# Patient Record
Sex: MALE | Race: WHITE | Employment: OTHER | URBAN - METROPOLITAN AREA
[De-identification: names, ages, dates, MRNs, and addresses within clinical notes are randomized per-mention and may not be internally consistent; named-entity substitution may affect disease eponyms.]

---

## 2017-03-24 ENCOUNTER — HOSPITAL ENCOUNTER (OUTPATIENT)
Dept: CT IMAGING | Age: 72
Discharge: HOME | End: 2017-03-24
Attending: INTERNAL MEDICINE | Admitting: INTERNAL MEDICINE

## 2017-03-24 DIAGNOSIS — C81.18 NODULAR SCLEROSIS CLASSICAL HODGKIN LYMPHOMA OF LYMPH NODES OF MULTIPLE SITES (HCC): ICD-10-CM

## 2017-03-24 DIAGNOSIS — Z51.12 ENCOUNTER FOR ANTINEOPLASTIC IMMUNOTHERAPY: ICD-10-CM

## 2017-04-05 ENCOUNTER — OFFICE VISIT (OUTPATIENT)
Dept: INTERNAL MEDICINE CLINIC | Age: 72
End: 2017-04-05

## 2017-04-05 VITALS
DIASTOLIC BLOOD PRESSURE: 80 MMHG | BODY MASS INDEX: 29.74 KG/M2 | WEIGHT: 219.6 LBS | OXYGEN SATURATION: 97 % | HEIGHT: 72 IN | SYSTOLIC BLOOD PRESSURE: 130 MMHG | HEART RATE: 91 BPM

## 2017-04-05 DIAGNOSIS — T45.1X5A CYTOTOXIC DRUG-INDUCED LUNG INJURY (HCC): ICD-10-CM

## 2017-04-05 DIAGNOSIS — T38.0X5A STEROID-INDUCED DIABETES MELLITUS (HCC): Chronic | ICD-10-CM

## 2017-04-05 DIAGNOSIS — E09.9 STEROID-INDUCED DIABETES MELLITUS (HCC): Chronic | ICD-10-CM

## 2017-04-05 DIAGNOSIS — K59.00 COLONIC CONSTIPATION: ICD-10-CM

## 2017-04-05 DIAGNOSIS — J70.9 CYTOTOXIC DRUG-INDUCED LUNG INJURY (HCC): ICD-10-CM

## 2017-04-05 DIAGNOSIS — C81.90 HODGKIN'S DISEASE IN ADULT (HCC): ICD-10-CM

## 2017-04-05 DIAGNOSIS — E03.2 HYPOTHYROIDISM DUE TO MEDICATION: Primary | ICD-10-CM

## 2017-04-05 DIAGNOSIS — R74.8 ABNORMAL LIVER ENZYMES: ICD-10-CM

## 2017-04-05 PROCEDURE — 99214 OFFICE O/P EST MOD 30 MIN: CPT | Performed by: INTERNAL MEDICINE

## 2017-04-05 RX ORDER — LEVOTHYROXINE SODIUM 0.07 MG/1
75 TABLET ORAL DAILY
Qty: 30 TABLET | Refills: 3 | Status: SHIPPED | OUTPATIENT
Start: 2017-04-05 | End: 2017-05-05 | Stop reason: SDUPTHER

## 2017-04-05 ASSESSMENT — ENCOUNTER SYMPTOMS
BLOOD IN STOOL: 0
STRIDOR: 0
SORE THROAT: 0
VOMITING: 0
EYES NEGATIVE: 1
RESPIRATORY NEGATIVE: 1
SPUTUM PRODUCTION: 0
EYE PAIN: 0
DIARRHEA: 1
BACK PAIN: 0
EYE DISCHARGE: 0
SHORTNESS OF BREATH: 0
BLURRED VISION: 0
WHEEZING: 0
NAUSEA: 0
DOUBLE VISION: 0
CONSTIPATION: 1
PHOTOPHOBIA: 0
ABDOMINAL PAIN: 0
HEARTBURN: 0
ORTHOPNEA: 0
HEMOPTYSIS: 0
EYE REDNESS: 0
COUGH: 0

## 2017-05-05 ENCOUNTER — OFFICE VISIT (OUTPATIENT)
Dept: ENDOCRINOLOGY | Age: 72
End: 2017-05-05

## 2017-05-05 VITALS
HEIGHT: 69 IN | RESPIRATION RATE: 16 BRPM | WEIGHT: 216 LBS | OXYGEN SATURATION: 95 % | HEART RATE: 70 BPM | BODY MASS INDEX: 31.99 KG/M2 | SYSTOLIC BLOOD PRESSURE: 138 MMHG | DIASTOLIC BLOOD PRESSURE: 86 MMHG

## 2017-05-05 DIAGNOSIS — E03.2 HYPOTHYROIDISM DUE TO DRUGS: ICD-10-CM

## 2017-05-05 DIAGNOSIS — E03.2 HYPOTHYROIDISM DUE TO MEDICATION: ICD-10-CM

## 2017-05-05 PROCEDURE — 3017F COLORECTAL CA SCREEN DOC REV: CPT | Performed by: INTERNAL MEDICINE

## 2017-05-05 PROCEDURE — 1123F ACP DISCUSS/DSCN MKR DOCD: CPT | Performed by: INTERNAL MEDICINE

## 2017-05-05 PROCEDURE — G8427 DOCREV CUR MEDS BY ELIG CLIN: HCPCS | Performed by: INTERNAL MEDICINE

## 2017-05-05 PROCEDURE — 4040F PNEUMOC VAC/ADMIN/RCVD: CPT | Performed by: INTERNAL MEDICINE

## 2017-05-05 PROCEDURE — 99204 OFFICE O/P NEW MOD 45 MIN: CPT | Performed by: INTERNAL MEDICINE

## 2017-05-05 PROCEDURE — G8417 CALC BMI ABV UP PARAM F/U: HCPCS | Performed by: INTERNAL MEDICINE

## 2017-05-05 PROCEDURE — 1036F TOBACCO NON-USER: CPT | Performed by: INTERNAL MEDICINE

## 2017-05-05 RX ORDER — LEVOTHYROXINE SODIUM 0.07 MG/1
75 TABLET ORAL DAILY
Qty: 30 TABLET | Refills: 0 | Status: SHIPPED | OUTPATIENT
Start: 2017-05-05 | End: 2017-05-17

## 2017-05-17 ENCOUNTER — TELEPHONE (OUTPATIENT)
Dept: ENDOCRINOLOGY | Age: 72
End: 2017-05-17

## 2017-05-17 RX ORDER — LEVOTHYROXINE SODIUM 88 UG/1
88 TABLET ORAL DAILY
Qty: 30 TABLET | Refills: 3 | Status: SHIPPED | OUTPATIENT
Start: 2017-05-17 | End: 2017-08-24 | Stop reason: SDUPTHER

## 2017-06-01 ENCOUNTER — HOSPITAL ENCOUNTER (OUTPATIENT)
Dept: CT IMAGING | Age: 72
Discharge: HOME | End: 2017-06-01
Admitting: INTERNAL MEDICINE

## 2017-06-01 DIAGNOSIS — Z51.12 ENCOUNTER FOR ANTINEOPLASTIC IMMUNOTHERAPY: ICD-10-CM

## 2017-08-03 ENCOUNTER — OFFICE VISIT (OUTPATIENT)
Dept: INTERNAL MEDICINE CLINIC | Age: 72
End: 2017-08-03

## 2017-08-03 VITALS
WEIGHT: 209.6 LBS | OXYGEN SATURATION: 97 % | DIASTOLIC BLOOD PRESSURE: 70 MMHG | SYSTOLIC BLOOD PRESSURE: 130 MMHG | HEART RATE: 71 BPM | BODY MASS INDEX: 28.39 KG/M2 | HEIGHT: 72 IN

## 2017-08-03 DIAGNOSIS — M25.512 ACUTE PAIN OF LEFT SHOULDER: Primary | ICD-10-CM

## 2017-08-03 PROCEDURE — 99213 OFFICE O/P EST LOW 20 MIN: CPT | Performed by: INTERNAL MEDICINE

## 2017-08-03 RX ORDER — TRAMADOL HYDROCHLORIDE 50 MG/1
50 TABLET ORAL EVERY 6 HOURS PRN
Qty: 40 TABLET | Refills: 0 | Status: SHIPPED | OUTPATIENT
Start: 2017-08-03 | End: 2017-08-11

## 2017-08-03 RX ORDER — TIZANIDINE 2 MG/1
2 TABLET ORAL EVERY 8 HOURS PRN
Qty: 30 TABLET | Refills: 0 | Status: SHIPPED | OUTPATIENT
Start: 2017-08-03 | End: 2017-08-11

## 2017-08-08 PROBLEM — E03.2 DRUG-INDUCED HYPOTHYROIDISM: Status: ACTIVE | Noted: 2017-08-08

## 2017-08-09 ENCOUNTER — HOSPITAL ENCOUNTER (OUTPATIENT)
Dept: MRI IMAGING | Age: 72
Discharge: HOME | End: 2017-08-09
Attending: INTERNAL MEDICINE | Admitting: INTERNAL MEDICINE

## 2017-08-09 ENCOUNTER — TELEPHONE (OUTPATIENT)
Dept: INTERNAL MEDICINE CLINIC | Age: 72
End: 2017-08-09

## 2017-08-09 DIAGNOSIS — M25.512 PAIN IN LEFT SHOULDER: ICD-10-CM

## 2017-08-09 DIAGNOSIS — M25.512 ACUTE PAIN OF LEFT SHOULDER: ICD-10-CM

## 2017-08-11 ENCOUNTER — OFFICE VISIT (OUTPATIENT)
Dept: ORTHOPEDIC SURGERY | Age: 72
End: 2017-08-11

## 2017-08-11 VITALS
BODY MASS INDEX: 28.17 KG/M2 | DIASTOLIC BLOOD PRESSURE: 79 MMHG | WEIGHT: 208 LBS | HEART RATE: 69 BPM | SYSTOLIC BLOOD PRESSURE: 134 MMHG | HEIGHT: 72 IN

## 2017-08-11 DIAGNOSIS — M25.512 ACUTE PAIN OF LEFT SHOULDER: Primary | ICD-10-CM

## 2017-08-11 DIAGNOSIS — M75.112 INCOMPLETE TEAR OF LEFT ROTATOR CUFF: ICD-10-CM

## 2017-08-11 PROCEDURE — G8427 DOCREV CUR MEDS BY ELIG CLIN: HCPCS | Performed by: ORTHOPAEDIC SURGERY

## 2017-08-11 PROCEDURE — 99214 OFFICE O/P EST MOD 30 MIN: CPT | Performed by: ORTHOPAEDIC SURGERY

## 2017-08-11 PROCEDURE — 3017F COLORECTAL CA SCREEN DOC REV: CPT | Performed by: ORTHOPAEDIC SURGERY

## 2017-08-11 PROCEDURE — 4040F PNEUMOC VAC/ADMIN/RCVD: CPT | Performed by: ORTHOPAEDIC SURGERY

## 2017-08-11 PROCEDURE — G8417 CALC BMI ABV UP PARAM F/U: HCPCS | Performed by: ORTHOPAEDIC SURGERY

## 2017-08-11 PROCEDURE — 1036F TOBACCO NON-USER: CPT | Performed by: ORTHOPAEDIC SURGERY

## 2017-08-11 PROCEDURE — 1123F ACP DISCUSS/DSCN MKR DOCD: CPT | Performed by: ORTHOPAEDIC SURGERY

## 2017-08-11 RX ORDER — METHYLPREDNISOLONE 4 MG/1
TABLET ORAL
Qty: 1 KIT | Refills: 0 | Status: SHIPPED | OUTPATIENT
Start: 2017-08-11 | End: 2017-08-24 | Stop reason: ALTCHOICE

## 2017-08-11 ASSESSMENT — ENCOUNTER SYMPTOMS
EYES NEGATIVE: 1
RESPIRATORY NEGATIVE: 1
BACK PAIN: 0
GASTROINTESTINAL NEGATIVE: 1

## 2017-08-15 ENCOUNTER — HOSPITAL ENCOUNTER (OUTPATIENT)
Dept: PHYSICAL THERAPY | Age: 72
Discharge: HOME | End: 2017-08-31
Attending: ORTHOPAEDIC SURGERY | Admitting: ORTHOPAEDIC SURGERY

## 2017-08-24 ENCOUNTER — OFFICE VISIT (OUTPATIENT)
Dept: ENDOCRINOLOGY | Age: 72
End: 2017-08-24

## 2017-08-24 VITALS
HEIGHT: 72 IN | SYSTOLIC BLOOD PRESSURE: 138 MMHG | WEIGHT: 208.4 LBS | BODY MASS INDEX: 28.23 KG/M2 | DIASTOLIC BLOOD PRESSURE: 72 MMHG | RESPIRATION RATE: 16 BRPM | HEART RATE: 68 BPM

## 2017-08-24 DIAGNOSIS — E03.2 HYPOTHYROIDISM DUE TO DRUGS: Primary | ICD-10-CM

## 2017-08-24 PROCEDURE — 1123F ACP DISCUSS/DSCN MKR DOCD: CPT | Performed by: INTERNAL MEDICINE

## 2017-08-24 PROCEDURE — 99213 OFFICE O/P EST LOW 20 MIN: CPT | Performed by: INTERNAL MEDICINE

## 2017-08-24 PROCEDURE — G8427 DOCREV CUR MEDS BY ELIG CLIN: HCPCS | Performed by: INTERNAL MEDICINE

## 2017-08-24 PROCEDURE — G8417 CALC BMI ABV UP PARAM F/U: HCPCS | Performed by: INTERNAL MEDICINE

## 2017-08-24 PROCEDURE — 1036F TOBACCO NON-USER: CPT | Performed by: INTERNAL MEDICINE

## 2017-08-24 PROCEDURE — 3017F COLORECTAL CA SCREEN DOC REV: CPT | Performed by: INTERNAL MEDICINE

## 2017-08-24 PROCEDURE — 4040F PNEUMOC VAC/ADMIN/RCVD: CPT | Performed by: INTERNAL MEDICINE

## 2017-08-24 RX ORDER — LEVOTHYROXINE SODIUM 88 UG/1
88 TABLET ORAL DAILY
Qty: 90 TABLET | Refills: 1 | Status: SHIPPED | OUTPATIENT
Start: 2017-08-24 | End: 2017-12-28 | Stop reason: SDUPTHER

## 2017-09-11 ENCOUNTER — HOSPITAL ENCOUNTER (OUTPATIENT)
Dept: PHYSICAL THERAPY | Age: 72
Discharge: HOME | End: 2017-09-12
Admitting: ORTHOPAEDIC SURGERY

## 2017-09-15 ENCOUNTER — HOSPITAL ENCOUNTER (OUTPATIENT)
Dept: PHYSICAL THERAPY | Age: 72
Discharge: HOME | End: 2017-09-16
Admitting: ORTHOPAEDIC SURGERY

## 2017-09-18 ENCOUNTER — HOSPITAL ENCOUNTER (OUTPATIENT)
Dept: PHYSICAL THERAPY | Age: 72
Discharge: HOME | End: 2017-09-19
Admitting: ORTHOPAEDIC SURGERY

## 2017-09-22 ENCOUNTER — HOSPITAL ENCOUNTER (OUTPATIENT)
Dept: PHYSICAL THERAPY | Age: 72
Discharge: HOME | End: 2017-09-23
Admitting: ORTHOPAEDIC SURGERY

## 2017-09-22 ENCOUNTER — OFFICE VISIT (OUTPATIENT)
Dept: ORTHOPEDIC SURGERY | Age: 72
End: 2017-09-22

## 2017-09-22 VITALS
HEART RATE: 80 BPM | SYSTOLIC BLOOD PRESSURE: 120 MMHG | RESPIRATION RATE: 12 BRPM | WEIGHT: 208 LBS | HEIGHT: 72 IN | DIASTOLIC BLOOD PRESSURE: 80 MMHG | BODY MASS INDEX: 28.17 KG/M2

## 2017-09-22 DIAGNOSIS — M75.112 INCOMPLETE TEAR OF LEFT ROTATOR CUFF: ICD-10-CM

## 2017-09-22 DIAGNOSIS — M25.512 ACUTE PAIN OF LEFT SHOULDER: Primary | ICD-10-CM

## 2017-09-22 PROCEDURE — 99213 OFFICE O/P EST LOW 20 MIN: CPT | Performed by: ORTHOPAEDIC SURGERY

## 2017-09-22 PROCEDURE — 1036F TOBACCO NON-USER: CPT | Performed by: ORTHOPAEDIC SURGERY

## 2017-09-22 PROCEDURE — G8417 CALC BMI ABV UP PARAM F/U: HCPCS | Performed by: ORTHOPAEDIC SURGERY

## 2017-09-22 PROCEDURE — 1123F ACP DISCUSS/DSCN MKR DOCD: CPT | Performed by: ORTHOPAEDIC SURGERY

## 2017-09-22 PROCEDURE — 3017F COLORECTAL CA SCREEN DOC REV: CPT | Performed by: ORTHOPAEDIC SURGERY

## 2017-09-22 PROCEDURE — 4040F PNEUMOC VAC/ADMIN/RCVD: CPT | Performed by: ORTHOPAEDIC SURGERY

## 2017-09-22 PROCEDURE — G8427 DOCREV CUR MEDS BY ELIG CLIN: HCPCS | Performed by: ORTHOPAEDIC SURGERY

## 2017-09-25 ENCOUNTER — HOSPITAL ENCOUNTER (OUTPATIENT)
Dept: PHYSICAL THERAPY | Age: 72
Discharge: HOME | End: 2017-09-26
Admitting: ORTHOPAEDIC SURGERY

## 2017-10-02 ENCOUNTER — HOSPITAL ENCOUNTER (OUTPATIENT)
Dept: PHYSICAL THERAPY | Age: 72
Discharge: HOME | End: 2017-10-03
Admitting: ORTHOPAEDIC SURGERY

## 2017-10-25 ENCOUNTER — HOSPITAL ENCOUNTER (OUTPATIENT)
Dept: CT IMAGING | Age: 72
Discharge: HOME | End: 2017-10-25
Attending: INTERNAL MEDICINE | Admitting: INTERNAL MEDICINE

## 2017-10-25 DIAGNOSIS — C81.90 HODGKIN LYMPHOMA (HCC): ICD-10-CM

## 2017-10-25 DIAGNOSIS — C81.00 NODULAR LYMPHOCYTE PREDOMINANT HODGKIN LYMPHOMA, UNSPECIFIED BODY REGION (HCC): ICD-10-CM

## 2017-10-25 LAB
GFR AFRICAN AMERICAN: >60
GFR NON-AFRICAN AMERICAN: >60
PERFORMED ON: NORMAL
POC CREATININE: 0.9 MG/DL (ref 0.8–1.3)
POC SAMPLE TYPE: NORMAL

## 2017-11-01 ENCOUNTER — HOSPITAL ENCOUNTER (OUTPATIENT)
Dept: OTHER | Age: 72
Discharge: HOME | End: 2017-11-30
Attending: ORTHOPAEDIC SURGERY | Admitting: ORTHOPAEDIC SURGERY

## 2017-12-12 ENCOUNTER — TELEPHONE (OUTPATIENT)
Dept: FAMILY MEDICINE CLINIC | Age: 72
End: 2017-12-12

## 2017-12-14 NOTE — TELEPHONE ENCOUNTER
Called patient to advised that the lab down stairs did not have it either, patient is going to try to email results me he said if that did not work he will drop them off

## 2017-12-28 ENCOUNTER — OFFICE VISIT (OUTPATIENT)
Dept: ENDOCRINOLOGY | Age: 72
End: 2017-12-28

## 2017-12-28 VITALS
WEIGHT: 210.4 LBS | BODY MASS INDEX: 28.5 KG/M2 | SYSTOLIC BLOOD PRESSURE: 130 MMHG | DIASTOLIC BLOOD PRESSURE: 78 MMHG | HEART RATE: 82 BPM | RESPIRATION RATE: 16 BRPM | HEIGHT: 72 IN

## 2017-12-28 DIAGNOSIS — E03.2 HYPOTHYROIDISM DUE TO DRUGS: Primary | ICD-10-CM

## 2017-12-28 PROCEDURE — 1036F TOBACCO NON-USER: CPT | Performed by: INTERNAL MEDICINE

## 2017-12-28 PROCEDURE — 3017F COLORECTAL CA SCREEN DOC REV: CPT | Performed by: INTERNAL MEDICINE

## 2017-12-28 PROCEDURE — 99213 OFFICE O/P EST LOW 20 MIN: CPT | Performed by: INTERNAL MEDICINE

## 2017-12-28 PROCEDURE — G8417 CALC BMI ABV UP PARAM F/U: HCPCS | Performed by: INTERNAL MEDICINE

## 2017-12-28 PROCEDURE — 1123F ACP DISCUSS/DSCN MKR DOCD: CPT | Performed by: INTERNAL MEDICINE

## 2017-12-28 PROCEDURE — G8484 FLU IMMUNIZE NO ADMIN: HCPCS | Performed by: INTERNAL MEDICINE

## 2017-12-28 PROCEDURE — G8427 DOCREV CUR MEDS BY ELIG CLIN: HCPCS | Performed by: INTERNAL MEDICINE

## 2017-12-28 PROCEDURE — 4040F PNEUMOC VAC/ADMIN/RCVD: CPT | Performed by: INTERNAL MEDICINE

## 2017-12-28 RX ORDER — LEVOTHYROXINE SODIUM 88 UG/1
88 TABLET ORAL DAILY
Qty: 90 TABLET | Refills: 1 | Status: SHIPPED | OUTPATIENT
Start: 2017-12-28 | End: 2018-06-28 | Stop reason: SDUPTHER

## 2017-12-28 NOTE — PROGRESS NOTES
Subjective:      65 y/o WM who is here for thyroid evaluation. He was diagnosed with hodgkin's lymphoma. Is currently on Nivolumab. TSH had been gradually increasing, he was on levothyroxine 75mcg for 20 days. Labs:  12/16 TSH 2.3  1/17 TSH 3.1  2/17 TSH 4.47  3/17 TSH 46  4/17 TSH 74  4/17 TSH 25 on levothyroxine 75mcg    Initial complaints: Fatigue, Muscle pain  History of obstructive symptoms:  difficulty swallowing No, changes in voice/hoarseness  No.    5/17 TSH 19 FT4 1.0  Level have improved but needs adjustment, increased to 88mcg    8/17 TSH 2.7  12/17 TSH 1.5    Past Medical History:   Diagnosis Date    Bleomycin lung toxicity     Lymphoma ([facility] [address]     Hodgkins stage 3     Past Surgical History:   Procedure Laterality Date    BONE MARROW BIOPSY  8/8 and 8/18/2014    Positive for lymphoma with subsequent negative    CATARACT REMOVAL Bilateral 7/23/2015    COLONOSCOPY  3/15    Dr. Krista Meek - [place] [facility]  8/6/14    Dr. Klarissa Maldonado Left 06/16/2016    CERVICAL LYMPH NODE BIOPSY        TUNNELED VENOUS PORT PLACEMENT  08/14/2014    Dr. Adela Fernandes, power port    UPPER GASTROINTESTINAL ENDOSCOPY  3/15    Dr. Krsita Meek     Current Outpatient Prescriptions   Medication Sig Dispense Refill    levothyroxine (LEVOTHROID) 88 MCG tablet Take 1 tablet by mouth daily 90 tablet 1    aspirin 81 MG chewable tablet Take 81 mg by mouth daily      Multiple Vitamins-Minerals (MULTIVITAMIN PO) Take  by mouth. No current facility-administered medications for this visit.         Review of Systems  scanned       Objective:    /78 (Site: Right Arm, Position: Sitting, Cuff Size: Medium Adult)   Pulse 82   Resp 16   Ht 6' (1.829 m)   Wt 210 lb 6.4 oz (95.4 kg)   BMI 28.54 kg/m²   Wt Readings from Last 3 Encounters:   12/28/17 210 lb 6.4 oz (95.4 kg)   09/22/17 208 lb (94.3 kg)   08/24/17 208 lb 6.4 oz (94.5 kg)       Constitutional: Well-developed, appears stated age, cooperative, in no acute distress  H/E/N/M/T:atraumatic, normocephalic, external ears, nose, lips normal without lesions  Eyes: Lids, lashes, conjunctivae and sclerae normal, No proptosis  Neck: supple, symmetrical, trachea midline   Thyroid: gland size normal  Skin: No obvious rashes or lesions present. Skin and hair texture normal  Psychiatric: Judgement and Insight:  judgement and insight appear normal  Neuro: Normal without focal findings, speech is normal normal, speech is spontaneous    Lab Review  Lab Results   Component Value Date    TSH 2.780 08/22/2017     No results found for: FREET4      Assessment: 1. Hypothyroidism: Secondary to drug therapy, TSH gradually increasing, now on replacement with improvement in levels. Now euthyroid Discussed would need TSH monitoring since on Nivolumab  2. Hodgkin's Lymphoma     Plan: 1. Levothyroxine 88mcg  2. TFT in 6 months

## 2018-01-17 ENCOUNTER — HOSPITAL ENCOUNTER (OUTPATIENT)
Dept: CT IMAGING | Age: 73
Discharge: HOME | End: 2018-01-17
Attending: INTERNAL MEDICINE | Admitting: INTERNAL MEDICINE

## 2018-01-17 DIAGNOSIS — C81.90 HODGKIN LYMPHOMA, UNSPECIFIED HODGKIN LYMPHOMA TYPE, UNSPECIFIED BODY REGION (HCC): ICD-10-CM

## 2018-01-17 DIAGNOSIS — C81.90 HODGKIN LYMPHOMA (HCC): ICD-10-CM

## 2018-03-05 ENCOUNTER — OFFICE VISIT (OUTPATIENT)
Dept: INTERNAL MEDICINE CLINIC | Age: 73
End: 2018-03-05

## 2018-03-05 VITALS
DIASTOLIC BLOOD PRESSURE: 70 MMHG | SYSTOLIC BLOOD PRESSURE: 136 MMHG | OXYGEN SATURATION: 97 % | RESPIRATION RATE: 16 BRPM | WEIGHT: 212 LBS | HEART RATE: 76 BPM | BODY MASS INDEX: 28.71 KG/M2 | HEIGHT: 72 IN | TEMPERATURE: 97.6 F

## 2018-03-05 DIAGNOSIS — E09.9 STEROID-INDUCED DIABETES MELLITUS (HCC): ICD-10-CM

## 2018-03-05 DIAGNOSIS — C81.90 HODGKIN'S DISEASE IN ADULT (HCC): ICD-10-CM

## 2018-03-05 DIAGNOSIS — B35.1 DERMATOPHYTIC ONYCHIA: ICD-10-CM

## 2018-03-05 DIAGNOSIS — H91.93 BILATERAL HEARING LOSS, UNSPECIFIED HEARING LOSS TYPE: ICD-10-CM

## 2018-03-05 DIAGNOSIS — N48.6 PEYRONIE DISEASE: Primary | ICD-10-CM

## 2018-03-05 DIAGNOSIS — K59.00 COLONIC CONSTIPATION: ICD-10-CM

## 2018-03-05 DIAGNOSIS — J84.10 PULMONARY FIBROSIS (HCC): ICD-10-CM

## 2018-03-05 DIAGNOSIS — T45.1X5A CYTOTOXIC DRUG-INDUCED LUNG INJURY (HCC): ICD-10-CM

## 2018-03-05 DIAGNOSIS — E03.2 HYPOTHYROIDISM DUE TO DRUGS: ICD-10-CM

## 2018-03-05 DIAGNOSIS — T38.0X5A STEROID-INDUCED DIABETES MELLITUS (HCC): ICD-10-CM

## 2018-03-05 DIAGNOSIS — J70.9 CYTOTOXIC DRUG-INDUCED LUNG INJURY (HCC): ICD-10-CM

## 2018-03-05 PROCEDURE — 3288F FALL RISK ASSESSMENT DOCD: CPT | Performed by: INTERNAL MEDICINE

## 2018-03-05 PROCEDURE — 99214 OFFICE O/P EST MOD 30 MIN: CPT | Performed by: INTERNAL MEDICINE

## 2018-03-05 ASSESSMENT — ENCOUNTER SYMPTOMS
EYE DISCHARGE: 0
ORTHOPNEA: 0
WHEEZING: 0
CONSTIPATION: 1
NAUSEA: 0
BLOOD IN STOOL: 0
HEARTBURN: 0
BLURRED VISION: 0
SORE THROAT: 0
EYES NEGATIVE: 1
RESPIRATORY NEGATIVE: 1
STRIDOR: 0
HEMOPTYSIS: 0
SPUTUM PRODUCTION: 0
PHOTOPHOBIA: 0
EYE PAIN: 0
BACK PAIN: 0
COUGH: 0
DIARRHEA: 1
EYE REDNESS: 0
DOUBLE VISION: 0
VOMITING: 0
ABDOMINAL PAIN: 0
SHORTNESS OF BREATH: 0

## 2018-03-05 NOTE — PROGRESS NOTES
fingernails and toenails have been thicker.     Has been having bowel problems recently as well with some diarrhea, has been taking immodium. Has been having rectal pain and bleeding recently. Prior to that he was experiencing some constipation, especially in the hospital.  His constipation has improved since starting levothyroxine with Dr. Antonette Peter. He is very concerned about bent penis during his erections, feels this is due to opdivo. He also notes bilateral hearing loss. He has been having right great toe pain, feels it is fractured. Review of Systems   Constitutional: Positive for malaise/fatigue. Negative for chills, diaphoresis, fever and weight loss. HENT: Negative for congestion, ear discharge, ear pain, hearing loss, nosebleeds, sore throat and tinnitus. Eyes: Negative. Negative for blurred vision, double vision, photophobia, pain, discharge and redness. Respiratory: Negative. Negative for cough, hemoptysis, sputum production, shortness of breath, wheezing and stridor. Cardiovascular: Negative. Negative for chest pain, palpitations, orthopnea, claudication, leg swelling and PND. Gastrointestinal: Positive for constipation and diarrhea. Negative for abdominal pain, blood in stool, heartburn, melena, nausea and vomiting. Genitourinary: Negative. Negative for dysuria, flank pain, frequency, hematuria and urgency. Musculoskeletal: Negative. Negative for back pain, falls, joint pain, myalgias and neck pain. Skin: Positive for rash. Negative for itching. Nail brittleness   Neurological: Positive for dizziness and weakness. Negative for tingling, tremors, sensory change, speech change, focal weakness, seizures, loss of consciousness and headaches. Endo/Heme/Allergies: Negative. Psychiatric/Behavioral: Negative. Negative for depression, hallucinations, memory loss, substance abuse and suicidal ideas. The patient is not nervous/anxious and does not have insomnia.

## 2018-03-12 ENCOUNTER — TELEPHONE (OUTPATIENT)
Dept: INTERNAL MEDICINE CLINIC | Age: 73
End: 2018-03-12

## 2018-03-12 NOTE — TELEPHONE ENCOUNTER
Needs clarification on which toes are being treated and how many drops on each toe.     Jublia 10% solution     please advise pharmacy

## 2018-03-16 ENCOUNTER — OFFICE VISIT (OUTPATIENT)
Dept: ENT CLINIC | Age: 73
End: 2018-03-16

## 2018-03-16 VITALS — HEART RATE: 76 BPM | DIASTOLIC BLOOD PRESSURE: 78 MMHG | SYSTOLIC BLOOD PRESSURE: 126 MMHG | OXYGEN SATURATION: 96 %

## 2018-03-16 DIAGNOSIS — H61.21 CERUMEN DEBRIS ON TYMPANIC MEMBRANE OF RIGHT EAR: ICD-10-CM

## 2018-03-16 DIAGNOSIS — H90.3 SENSORINEURAL HEARING LOSS (SNHL) OF BOTH EARS: Primary | ICD-10-CM

## 2018-03-16 DIAGNOSIS — H90.3 SENSORINEURAL HEARING LOSS OF BOTH EARS: Primary | ICD-10-CM

## 2018-03-16 PROCEDURE — 1123F ACP DISCUSS/DSCN MKR DOCD: CPT | Performed by: OTOLARYNGOLOGY

## 2018-03-16 PROCEDURE — 69210 REMOVE IMPACTED EAR WAX UNI: CPT | Performed by: OTOLARYNGOLOGY

## 2018-03-16 PROCEDURE — 99202 OFFICE O/P NEW SF 15 MIN: CPT | Performed by: OTOLARYNGOLOGY

## 2018-03-16 PROCEDURE — 1036F TOBACCO NON-USER: CPT | Performed by: OTOLARYNGOLOGY

## 2018-03-16 PROCEDURE — 4040F PNEUMOC VAC/ADMIN/RCVD: CPT | Performed by: OTOLARYNGOLOGY

## 2018-03-16 PROCEDURE — G8417 CALC BMI ABV UP PARAM F/U: HCPCS | Performed by: OTOLARYNGOLOGY

## 2018-03-16 PROCEDURE — 3017F COLORECTAL CA SCREEN DOC REV: CPT | Performed by: OTOLARYNGOLOGY

## 2018-03-16 PROCEDURE — G8484 FLU IMMUNIZE NO ADMIN: HCPCS | Performed by: OTOLARYNGOLOGY

## 2018-03-16 PROCEDURE — G8427 DOCREV CUR MEDS BY ELIG CLIN: HCPCS | Performed by: OTOLARYNGOLOGY

## 2018-03-16 PROCEDURE — 92553 AUDIOMETRY AIR & BONE: CPT | Performed by: AUDIOLOGIST

## 2018-03-16 NOTE — PROGRESS NOTES
likely above 1500 cps. It is associated with an increased speech reception threshold and decreased word recognition score. The sensation level is moderately elevated  Tympanometry reveals normal middle ear pressure, compliance    Impression:  Bilateral degenerative presbycusis  Right cerumen, resolved    Plan:  We discussed avoiding cotton applicators to clean the ears  He may try rubbing alcohol monthly as maintenance  He is a good candidate for amplification by means of hearing aid.   He will consider this option  Repeat the audiogram in 1 year

## 2018-04-02 ENCOUNTER — TELEPHONE (OUTPATIENT)
Dept: INTERNAL MEDICINE CLINIC | Age: 73
End: 2018-04-02

## 2018-04-25 ENCOUNTER — OFFICE VISIT (OUTPATIENT)
Dept: INTERNAL MEDICINE CLINIC | Age: 73
End: 2018-04-25

## 2018-04-25 VITALS
HEIGHT: 72 IN | OXYGEN SATURATION: 97 % | BODY MASS INDEX: 28.44 KG/M2 | RESPIRATION RATE: 16 BRPM | DIASTOLIC BLOOD PRESSURE: 78 MMHG | TEMPERATURE: 97.4 F | WEIGHT: 210 LBS | SYSTOLIC BLOOD PRESSURE: 132 MMHG | HEART RATE: 100 BPM

## 2018-04-25 DIAGNOSIS — T45.1X5A CYTOTOXIC DRUG-INDUCED LUNG INJURY (HCC): ICD-10-CM

## 2018-04-25 DIAGNOSIS — C81.90 HODGKIN'S DISEASE IN ADULT (HCC): ICD-10-CM

## 2018-04-25 DIAGNOSIS — H90.3 SENSORINEURAL HEARING LOSS (SNHL) OF BOTH EARS: ICD-10-CM

## 2018-04-25 DIAGNOSIS — N48.6 PEYRONIE DISEASE: Primary | ICD-10-CM

## 2018-04-25 DIAGNOSIS — G62.0 CHEMOTHERAPY-INDUCED NEUROPATHY (HCC): ICD-10-CM

## 2018-04-25 DIAGNOSIS — J70.9 CYTOTOXIC DRUG-INDUCED LUNG INJURY (HCC): ICD-10-CM

## 2018-04-25 DIAGNOSIS — T45.1X5A CHEMOTHERAPY-INDUCED NEUROPATHY (HCC): ICD-10-CM

## 2018-04-25 PROCEDURE — 99214 OFFICE O/P EST MOD 30 MIN: CPT | Performed by: INTERNAL MEDICINE

## 2018-04-25 ASSESSMENT — ENCOUNTER SYMPTOMS
HEMOPTYSIS: 0
HEARTBURN: 0
SORE THROAT: 0
EYES NEGATIVE: 1
STRIDOR: 0
VOMITING: 0
WHEEZING: 0
SPUTUM PRODUCTION: 0
EYE PAIN: 0
BLURRED VISION: 0
EYE DISCHARGE: 0
BACK PAIN: 0
ABDOMINAL PAIN: 0
DOUBLE VISION: 0
SHORTNESS OF BREATH: 0
COUGH: 0
DIARRHEA: 1
RESPIRATORY NEGATIVE: 1
NAUSEA: 0
ORTHOPNEA: 0
PHOTOPHOBIA: 0
BLOOD IN STOOL: 0
CONSTIPATION: 1
EYE REDNESS: 0

## 2018-05-10 ENCOUNTER — HOSPITAL ENCOUNTER (OUTPATIENT)
Dept: CT IMAGING | Age: 73
Discharge: HOME | End: 2018-05-10
Attending: INTERNAL MEDICINE | Admitting: INTERNAL MEDICINE

## 2018-05-10 DIAGNOSIS — C81.90 HODGKIN LYMPHOMA (HCC): ICD-10-CM

## 2018-05-10 DIAGNOSIS — C81.90 HODGKIN LYMPHOMA, UNSPECIFIED HODGKIN LYMPHOMA TYPE, UNSPECIFIED BODY REGION (HCC): ICD-10-CM

## 2018-05-10 LAB
GFR AFRICAN AMERICAN: >60
GFR NON-AFRICAN AMERICAN: >60
PERFORMED ON: ABNORMAL
POC CREATININE: <0.3 MG/DL (ref 0.8–1.3)
POC SAMPLE TYPE: ABNORMAL

## 2018-06-28 ENCOUNTER — OFFICE VISIT (OUTPATIENT)
Dept: ENDOCRINOLOGY | Age: 73
End: 2018-06-28

## 2018-06-28 VITALS
OXYGEN SATURATION: 94 % | HEART RATE: 77 BPM | DIASTOLIC BLOOD PRESSURE: 78 MMHG | RESPIRATION RATE: 16 BRPM | WEIGHT: 204.4 LBS | HEIGHT: 72 IN | SYSTOLIC BLOOD PRESSURE: 120 MMHG | BODY MASS INDEX: 27.68 KG/M2

## 2018-06-28 DIAGNOSIS — E03.2 HYPOTHYROIDISM DUE TO DRUGS: Primary | ICD-10-CM

## 2018-06-28 PROCEDURE — 99213 OFFICE O/P EST LOW 20 MIN: CPT | Performed by: INTERNAL MEDICINE

## 2018-06-28 PROCEDURE — 1036F TOBACCO NON-USER: CPT | Performed by: INTERNAL MEDICINE

## 2018-06-28 PROCEDURE — G8427 DOCREV CUR MEDS BY ELIG CLIN: HCPCS | Performed by: INTERNAL MEDICINE

## 2018-06-28 PROCEDURE — 3017F COLORECTAL CA SCREEN DOC REV: CPT | Performed by: INTERNAL MEDICINE

## 2018-06-28 PROCEDURE — G8417 CALC BMI ABV UP PARAM F/U: HCPCS | Performed by: INTERNAL MEDICINE

## 2018-06-28 PROCEDURE — 4040F PNEUMOC VAC/ADMIN/RCVD: CPT | Performed by: INTERNAL MEDICINE

## 2018-06-28 PROCEDURE — 1123F ACP DISCUSS/DSCN MKR DOCD: CPT | Performed by: INTERNAL MEDICINE

## 2018-06-28 RX ORDER — LEVOTHYROXINE SODIUM 88 UG/1
88 TABLET ORAL DAILY
Qty: 90 TABLET | Refills: 3 | Status: SHIPPED | OUTPATIENT
Start: 2018-06-28 | End: 2019-09-16 | Stop reason: SDUPTHER

## 2018-08-17 ENCOUNTER — HOSPITAL ENCOUNTER (OUTPATIENT)
Dept: CT IMAGING | Age: 73
Discharge: HOME | End: 2018-08-17
Attending: INTERNAL MEDICINE | Admitting: INTERNAL MEDICINE

## 2018-08-17 DIAGNOSIS — C81.90 HODGKIN LYMPHOMA, UNSPECIFIED HODGKIN LYMPHOMA TYPE, UNSPECIFIED BODY REGION (HCC): ICD-10-CM

## 2018-08-17 DIAGNOSIS — C81.90 HODGKIN LYMPHOMA (HCC): ICD-10-CM

## 2019-01-09 ENCOUNTER — HOSPITAL ENCOUNTER (OUTPATIENT)
Dept: CT IMAGING | Age: 74
Discharge: HOME | End: 2019-01-09
Payer: MEDICARE

## 2019-01-09 DIAGNOSIS — C81.00 NODULAR LYMPHOCYTE PREDOMINANT HODGKIN LYMPHOMA, UNSPECIFIED BODY REGION (HCC): ICD-10-CM

## 2019-01-09 PROCEDURE — 82565 ASSAY OF CREATININE: CPT

## 2019-01-09 PROCEDURE — 74177 CT ABD & PELVIS W/CONTRAST: CPT

## 2019-01-09 PROCEDURE — 6360000004 HC RX CONTRAST MEDICATION: Performed by: INTERNAL MEDICINE

## 2019-01-09 RX ADMIN — IOHEXOL 50 ML: 240 INJECTION, SOLUTION INTRATHECAL; INTRAVASCULAR; INTRAVENOUS; ORAL at 09:15

## 2019-01-09 RX ADMIN — IOPAMIDOL 75 ML: 755 INJECTION, SOLUTION INTRAVENOUS at 09:16

## 2019-01-11 ENCOUNTER — OFFICE VISIT (OUTPATIENT)
Dept: PULMONOLOGY | Age: 74
End: 2019-01-11
Payer: MEDICARE

## 2019-01-11 VITALS
HEIGHT: 72 IN | SYSTOLIC BLOOD PRESSURE: 120 MMHG | RESPIRATION RATE: 20 BRPM | DIASTOLIC BLOOD PRESSURE: 87 MMHG | TEMPERATURE: 97.7 F | HEART RATE: 96 BPM | BODY MASS INDEX: 27.09 KG/M2 | WEIGHT: 200 LBS | OXYGEN SATURATION: 91 %

## 2019-01-11 DIAGNOSIS — J96.11 CHRONIC RESPIRATORY FAILURE WITH HYPOXIA (HCC): ICD-10-CM

## 2019-01-11 DIAGNOSIS — J84.10 PULMONARY FIBROSIS (HCC): Primary | ICD-10-CM

## 2019-01-11 PROCEDURE — G8427 DOCREV CUR MEDS BY ELIG CLIN: HCPCS | Performed by: INTERNAL MEDICINE

## 2019-01-11 PROCEDURE — G8484 FLU IMMUNIZE NO ADMIN: HCPCS | Performed by: INTERNAL MEDICINE

## 2019-01-11 PROCEDURE — 1123F ACP DISCUSS/DSCN MKR DOCD: CPT | Performed by: INTERNAL MEDICINE

## 2019-01-11 PROCEDURE — 99214 OFFICE O/P EST MOD 30 MIN: CPT | Performed by: INTERNAL MEDICINE

## 2019-01-11 PROCEDURE — 1036F TOBACCO NON-USER: CPT | Performed by: INTERNAL MEDICINE

## 2019-01-11 PROCEDURE — 3017F COLORECTAL CA SCREEN DOC REV: CPT | Performed by: INTERNAL MEDICINE

## 2019-01-11 PROCEDURE — 1101F PT FALLS ASSESS-DOCD LE1/YR: CPT | Performed by: INTERNAL MEDICINE

## 2019-01-11 PROCEDURE — 4040F PNEUMOC VAC/ADMIN/RCVD: CPT | Performed by: INTERNAL MEDICINE

## 2019-01-11 PROCEDURE — G8417 CALC BMI ABV UP PARAM F/U: HCPCS | Performed by: INTERNAL MEDICINE

## 2019-01-11 RX ORDER — PREDNISONE 20 MG/1
40 TABLET ORAL DAILY
Qty: 60 TABLET | Refills: 0 | Status: SHIPPED | OUTPATIENT
Start: 2019-01-11 | End: 2019-02-05 | Stop reason: SDUPTHER

## 2019-01-18 ENCOUNTER — TELEPHONE (OUTPATIENT)
Dept: PULMONOLOGY | Age: 74
End: 2019-01-18

## 2019-01-18 ENCOUNTER — HOSPITAL ENCOUNTER (OUTPATIENT)
Dept: CARDIAC REHAB | Age: 74
Setting detail: THERAPIES SERIES
Discharge: HOME | End: 2019-01-18
Payer: MEDICARE

## 2019-01-18 DIAGNOSIS — J96.11 CHRONIC RESPIRATORY FAILURE WITH HYPOXIA (HCC): ICD-10-CM

## 2019-01-18 DIAGNOSIS — J84.10 PULMONARY FIBROSIS (HCC): ICD-10-CM

## 2019-01-18 PROCEDURE — 94618 PULMONARY STRESS TESTING: CPT | Performed by: INTERNAL MEDICINE

## 2019-01-18 PROCEDURE — 94618 PULMONARY STRESS TESTING: CPT

## 2019-01-21 RX ORDER — SULFAMETHOXAZOLE AND TRIMETHOPRIM 800; 160 MG/1; MG/1
TABLET ORAL
Qty: 30 TABLET | Refills: 0 | OUTPATIENT
Start: 2019-01-21 | End: 2019-02-05 | Stop reason: SDUPTHER

## 2019-01-23 ENCOUNTER — HOSPITAL ENCOUNTER (OUTPATIENT)
Dept: PULMONOLOGY | Age: 74
Discharge: HOME | End: 2019-01-23
Payer: MEDICARE

## 2019-01-23 DIAGNOSIS — J84.10 PULMONARY FIBROSIS (HCC): ICD-10-CM

## 2019-01-23 DIAGNOSIS — J96.11 CHRONIC RESPIRATORY FAILURE WITH HYPOXIA (HCC): ICD-10-CM

## 2019-01-23 PROCEDURE — 94664 DEMO&/EVAL PT USE INHALER: CPT

## 2019-01-23 PROCEDURE — 6370000000 HC RX 637 (ALT 250 FOR IP): Performed by: INTERNAL MEDICINE

## 2019-01-23 PROCEDURE — 94726 PLETHYSMOGRAPHY LUNG VOLUMES: CPT | Performed by: INTERNAL MEDICINE

## 2019-01-23 PROCEDURE — 94060 EVALUATION OF WHEEZING: CPT | Performed by: INTERNAL MEDICINE

## 2019-01-23 PROCEDURE — 94640 AIRWAY INHALATION TREATMENT: CPT

## 2019-01-23 PROCEDURE — 94729 DIFFUSING CAPACITY: CPT | Performed by: INTERNAL MEDICINE

## 2019-01-23 PROCEDURE — 94729 DIFFUSING CAPACITY: CPT

## 2019-01-23 PROCEDURE — 94060 EVALUATION OF WHEEZING: CPT

## 2019-01-23 PROCEDURE — 94726 PLETHYSMOGRAPHY LUNG VOLUMES: CPT

## 2019-01-23 RX ORDER — ALBUTEROL SULFATE 90 UG/1
4 AEROSOL, METERED RESPIRATORY (INHALATION) ONCE
Status: COMPLETED | OUTPATIENT
Start: 2019-01-23 | End: 2019-01-23

## 2019-01-23 RX ADMIN — ALBUTEROL SULFATE 4 PUFF: 90 AEROSOL, METERED RESPIRATORY (INHALATION) at 15:00

## 2019-02-05 ENCOUNTER — OFFICE VISIT (OUTPATIENT)
Dept: PULMONOLOGY | Age: 74
End: 2019-02-05
Payer: MEDICARE

## 2019-02-05 VITALS
TEMPERATURE: 98.1 F | HEIGHT: 72 IN | RESPIRATION RATE: 24 BRPM | OXYGEN SATURATION: 95 % | HEART RATE: 97 BPM | SYSTOLIC BLOOD PRESSURE: 138 MMHG | BODY MASS INDEX: 27.09 KG/M2 | DIASTOLIC BLOOD PRESSURE: 82 MMHG | WEIGHT: 200 LBS

## 2019-02-05 DIAGNOSIS — J70.9 CYTOTOXIC DRUG-INDUCED LUNG INJURY (HCC): Primary | ICD-10-CM

## 2019-02-05 DIAGNOSIS — J96.11 CHRONIC RESPIRATORY FAILURE WITH HYPOXIA (HCC): ICD-10-CM

## 2019-02-05 DIAGNOSIS — T45.1X5A CYTOTOXIC DRUG-INDUCED LUNG INJURY (HCC): ICD-10-CM

## 2019-02-05 DIAGNOSIS — R13.10 ODYNOPHAGIA: Primary | ICD-10-CM

## 2019-02-05 DIAGNOSIS — J70.9 CYTOTOXIC DRUG-INDUCED LUNG INJURY (HCC): ICD-10-CM

## 2019-02-05 DIAGNOSIS — T45.1X5A CYTOTOXIC DRUG-INDUCED LUNG INJURY (HCC): Primary | ICD-10-CM

## 2019-02-05 LAB
BASOPHILS ABSOLUTE: 0 K/UL (ref 0–0.2)
BASOPHILS RELATIVE PERCENT: 0.2 %
EOSINOPHILS ABSOLUTE: 0.1 K/UL (ref 0–0.6)
EOSINOPHILS RELATIVE PERCENT: 0.9 %
HCT VFR BLD CALC: 46.5 % (ref 40.5–52.5)
HEMOGLOBIN: 15.8 G/DL (ref 13.5–17.5)
LYMPHOCYTES ABSOLUTE: 1.6 K/UL (ref 1–5.1)
LYMPHOCYTES RELATIVE PERCENT: 16.3 %
MCH RBC QN AUTO: 30.5 PG (ref 26–34)
MCHC RBC AUTO-ENTMCNC: 34.1 G/DL (ref 31–36)
MCV RBC AUTO: 89.6 FL (ref 80–100)
MONOCYTES ABSOLUTE: 0.6 K/UL (ref 0–1.3)
MONOCYTES RELATIVE PERCENT: 5.6 %
NEUTROPHILS ABSOLUTE: 7.7 K/UL (ref 1.7–7.7)
NEUTROPHILS RELATIVE PERCENT: 77 %
PDW BLD-RTO: 14.1 % (ref 12.4–15.4)
PLATELET # BLD: 119 K/UL (ref 135–450)
PMV BLD AUTO: 8.5 FL (ref 5–10.5)
RBC # BLD: 5.18 M/UL (ref 4.2–5.9)
WBC # BLD: 10.1 K/UL (ref 4–11)

## 2019-02-05 PROCEDURE — 1036F TOBACCO NON-USER: CPT | Performed by: INTERNAL MEDICINE

## 2019-02-05 PROCEDURE — 3017F COLORECTAL CA SCREEN DOC REV: CPT | Performed by: INTERNAL MEDICINE

## 2019-02-05 PROCEDURE — 1101F PT FALLS ASSESS-DOCD LE1/YR: CPT | Performed by: INTERNAL MEDICINE

## 2019-02-05 PROCEDURE — 4040F PNEUMOC VAC/ADMIN/RCVD: CPT | Performed by: INTERNAL MEDICINE

## 2019-02-05 PROCEDURE — G8427 DOCREV CUR MEDS BY ELIG CLIN: HCPCS | Performed by: INTERNAL MEDICINE

## 2019-02-05 PROCEDURE — G8417 CALC BMI ABV UP PARAM F/U: HCPCS | Performed by: INTERNAL MEDICINE

## 2019-02-05 PROCEDURE — G8484 FLU IMMUNIZE NO ADMIN: HCPCS | Performed by: INTERNAL MEDICINE

## 2019-02-05 PROCEDURE — 1123F ACP DISCUSS/DSCN MKR DOCD: CPT | Performed by: INTERNAL MEDICINE

## 2019-02-05 PROCEDURE — 99214 OFFICE O/P EST MOD 30 MIN: CPT | Performed by: INTERNAL MEDICINE

## 2019-02-05 RX ORDER — PREDNISONE 20 MG/1
40 TABLET ORAL DAILY
Qty: 60 TABLET | Refills: 0 | Status: SHIPPED | OUTPATIENT
Start: 2019-02-05 | End: 2019-03-06

## 2019-02-05 RX ORDER — SULFAMETHOXAZOLE AND TRIMETHOPRIM 800; 160 MG/1; MG/1
TABLET ORAL
Qty: 30 TABLET | Refills: 0 | Status: SHIPPED | OUTPATIENT
Start: 2019-02-05 | End: 2019-05-03 | Stop reason: ALTCHOICE

## 2019-02-06 LAB — ANTI-NUCLEAR ANTIBODY (ANA): NEGATIVE

## 2019-02-08 LAB — ANCA IFA: NORMAL

## 2019-02-11 LAB
ALLERGEN BEEF: <0.1 KU/L
ALLERGEN PHOMA BETAE: <0.1 KU/L
ALLERGEN PORK: <0.1 KU/L
ALLERGEN SEE NOTE: NORMAL
ALLERGEN, FEATHER MIX: NEGATIVE KU/L
ASPERGILLUS FLAVUS ANTIBODIES: NORMAL
ASPERGILLUS FUMIGATUS #1: NORMAL
ASPERGILLUS FUMIGATUS #2: NORMAL
ASPERGILLUS FUMIGATUS #3: NORMAL
ASPERGILLUS FUMIGATUS #6: NORMAL
AUREOBASIDIUM PULLULANS: NORMAL
MICROPOLYSPORA FAENI: NORMAL
PIGEON SERUM ABS: NORMAL
SACCHAROMONOSPORA VIRIDIS: NORMAL
THERMOACTINOMYCES CANDIDUS AB: NORMAL
THERMOACTINOMYCES SACCHARI: NORMAL
THERMOACTINOMYCES VULGARIS #1: NORMAL

## 2019-02-13 ENCOUNTER — TELEPHONE (OUTPATIENT)
Dept: PULMONOLOGY | Age: 74
End: 2019-02-13

## 2019-02-13 ENCOUNTER — HOSPITAL ENCOUNTER (OUTPATIENT)
Dept: CT IMAGING | Age: 74
Discharge: HOME | End: 2019-02-13
Payer: MEDICARE

## 2019-02-13 DIAGNOSIS — T45.1X5A CYTOTOXIC DRUG-INDUCED LUNG INJURY (HCC): ICD-10-CM

## 2019-02-13 DIAGNOSIS — J70.9 CYTOTOXIC DRUG-INDUCED LUNG INJURY (HCC): ICD-10-CM

## 2019-02-13 PROCEDURE — 71250 CT THORAX DX C-: CPT

## 2019-02-27 ENCOUNTER — TELEPHONE (OUTPATIENT)
Dept: PULMONOLOGY | Age: 74
End: 2019-02-27

## 2019-03-06 ENCOUNTER — OFFICE VISIT (OUTPATIENT)
Dept: PULMONOLOGY | Age: 74
End: 2019-03-06
Payer: MEDICARE

## 2019-03-06 ENCOUNTER — TELEPHONE (OUTPATIENT)
Dept: PULMONOLOGY | Age: 74
End: 2019-03-06

## 2019-03-06 VITALS
HEART RATE: 106 BPM | DIASTOLIC BLOOD PRESSURE: 82 MMHG | OXYGEN SATURATION: 96 % | SYSTOLIC BLOOD PRESSURE: 132 MMHG | HEIGHT: 72 IN | RESPIRATION RATE: 20 BRPM | BODY MASS INDEX: 26.98 KG/M2 | TEMPERATURE: 97.8 F | WEIGHT: 199.2 LBS

## 2019-03-06 DIAGNOSIS — J84.89: Primary | ICD-10-CM

## 2019-03-06 DIAGNOSIS — T50.905A: Primary | ICD-10-CM

## 2019-03-06 DIAGNOSIS — J96.11 CHRONIC RESPIRATORY FAILURE WITH HYPOXIA (HCC): ICD-10-CM

## 2019-03-06 PROCEDURE — G8484 FLU IMMUNIZE NO ADMIN: HCPCS | Performed by: INTERNAL MEDICINE

## 2019-03-06 PROCEDURE — G8427 DOCREV CUR MEDS BY ELIG CLIN: HCPCS | Performed by: INTERNAL MEDICINE

## 2019-03-06 PROCEDURE — 99213 OFFICE O/P EST LOW 20 MIN: CPT | Performed by: INTERNAL MEDICINE

## 2019-03-06 PROCEDURE — 1123F ACP DISCUSS/DSCN MKR DOCD: CPT | Performed by: INTERNAL MEDICINE

## 2019-03-06 PROCEDURE — G8417 CALC BMI ABV UP PARAM F/U: HCPCS | Performed by: INTERNAL MEDICINE

## 2019-03-06 PROCEDURE — 3017F COLORECTAL CA SCREEN DOC REV: CPT | Performed by: INTERNAL MEDICINE

## 2019-03-06 PROCEDURE — 4040F PNEUMOC VAC/ADMIN/RCVD: CPT | Performed by: INTERNAL MEDICINE

## 2019-03-06 PROCEDURE — 1101F PT FALLS ASSESS-DOCD LE1/YR: CPT | Performed by: INTERNAL MEDICINE

## 2019-03-06 PROCEDURE — 1036F TOBACCO NON-USER: CPT | Performed by: INTERNAL MEDICINE

## 2019-03-06 RX ORDER — PREDNISONE 20 MG/1
TABLET ORAL
Qty: 60 TABLET | Refills: 0 | Status: SHIPPED | OUTPATIENT
Start: 2019-03-06 | End: 2019-04-13

## 2019-03-08 ENCOUNTER — TELEPHONE (OUTPATIENT)
Dept: PULMONOLOGY | Age: 74
End: 2019-03-08

## 2019-03-11 ENCOUNTER — TELEPHONE (OUTPATIENT)
Dept: PULMONOLOGY | Age: 74
End: 2019-03-11

## 2019-04-08 ENCOUNTER — HOSPITAL ENCOUNTER (OUTPATIENT)
Dept: PULMONOLOGY | Age: 74
Discharge: HOME | End: 2019-04-08
Payer: MEDICARE

## 2019-04-08 DIAGNOSIS — T50.905A: ICD-10-CM

## 2019-04-08 DIAGNOSIS — J84.89: ICD-10-CM

## 2019-04-08 PROCEDURE — 94726 PLETHYSMOGRAPHY LUNG VOLUMES: CPT

## 2019-04-08 PROCEDURE — 94060 EVALUATION OF WHEEZING: CPT | Performed by: INTERNAL MEDICINE

## 2019-04-08 PROCEDURE — 6370000000 HC RX 637 (ALT 250 FOR IP): Performed by: INTERNAL MEDICINE

## 2019-04-08 PROCEDURE — 94640 AIRWAY INHALATION TREATMENT: CPT

## 2019-04-08 PROCEDURE — 94726 PLETHYSMOGRAPHY LUNG VOLUMES: CPT | Performed by: INTERNAL MEDICINE

## 2019-04-08 PROCEDURE — 94664 DEMO&/EVAL PT USE INHALER: CPT

## 2019-04-08 PROCEDURE — 94729 DIFFUSING CAPACITY: CPT

## 2019-04-08 PROCEDURE — 94060 EVALUATION OF WHEEZING: CPT

## 2019-04-08 PROCEDURE — 94729 DIFFUSING CAPACITY: CPT | Performed by: INTERNAL MEDICINE

## 2019-04-08 RX ORDER — ALBUTEROL SULFATE 90 UG/1
4 AEROSOL, METERED RESPIRATORY (INHALATION) ONCE
Status: COMPLETED | OUTPATIENT
Start: 2019-04-08 | End: 2019-04-08

## 2019-04-08 RX ADMIN — ALBUTEROL SULFATE 4 PUFF: 90 AEROSOL, METERED RESPIRATORY (INHALATION) at 11:20

## 2019-04-09 LAB
DLCO %PRED: 53 %
DLCO PRED: NORMAL ML/MIN/MMHG
DLCO/VA %PRED: NORMAL %
DLCO/VA PRED: NORMAL ML/MIN/MMHG
DLCO/VA: NORMAL ML/MIN/MMHG
DLCO: NORMAL ML/MIN/MMHG
EXPIRATORY TIME-POST: NORMAL SEC
EXPIRATORY TIME: NORMAL SEC
FEF 25-75% %CHNG: NORMAL
FEF 25-75% %PRED-POST: NORMAL %
FEF 25-75% %PRED-PRE: NORMAL L/SEC
FEF 25-75% PRED: NORMAL L/SEC
FEF 25-75%-POST: NORMAL L/SEC
FEF 25-75%-PRE: NORMAL L/SEC
FEV1 %PRED-POST: 76 %
FEV1 %PRED-PRE: 72 %
FEV1 PRED: NORMAL L
FEV1-POST: NORMAL L
FEV1-PRE: NORMAL L
FEV1/FVC %PRED-POST: NORMAL %
FEV1/FVC %PRED-PRE: NORMAL %
FEV1/FVC PRED: NORMAL %
FEV1/FVC-POST: 119 %
FEV1/FVC-PRE: 116 %
FVC %PRED-POST: NORMAL L
FVC %PRED-PRE: NORMAL %
FVC PRED: NORMAL L
FVC-POST: NORMAL L
FVC-PRE: NORMAL L
GAW %PRED: NORMAL %
GAW PRED: NORMAL L/S/CMH2O
GAW: NORMAL L/S/CMH2O
IC %PRED: NORMAL %
IC PRED: NORMAL L
IC: NORMAL L
MEP: NORMAL
MIP: NORMAL
MVV %PRED-PRE: NORMAL %
MVV PRED: NORMAL L/MIN
MVV-PRE: NORMAL L/MIN
PEF %PRED-POST: NORMAL %
PEF %PRED-PRE: NORMAL L/SEC
PEF PRED: NORMAL L/SEC
PEF%CHNG: NORMAL
PEF-POST: NORMAL L/SEC
PEF-PRE: NORMAL L/SEC
RAW %PRED: NORMAL %
RAW PRED: NORMAL CMH2O/L/S
RAW: NORMAL CMH2O/L/S
RV %PRED: NORMAL %
RV PRED: NORMAL L
RV: NORMAL L
SVC %PRED: NORMAL %
SVC PRED: NORMAL L
SVC: NORMAL L
TLC %PRED: 56 %
TLC PRED: NORMAL L
TLC: NORMAL L
VA %PRED: NORMAL %
VA PRED: NORMAL L
VA: NORMAL L
VTG %PRED: NORMAL %
VTG PRED: NORMAL L
VTG: NORMAL L

## 2019-04-09 ASSESSMENT — PULMONARY FUNCTION TESTS
FEV1/FVC_POST: 119
FEV1_PERCENT_PREDICTED_PRE: 72
FEV1/FVC_PRE: 116
FEV1_PERCENT_PREDICTED_POST: 76

## 2019-04-23 ENCOUNTER — HOSPITAL ENCOUNTER (OUTPATIENT)
Dept: GENERAL RADIOLOGY | Age: 74
Discharge: HOME | End: 2019-04-23
Payer: MEDICARE

## 2019-04-23 DIAGNOSIS — R13.12 DYSPHAGIA, OROPHARYNGEAL: ICD-10-CM

## 2019-04-23 PROCEDURE — 74220 X-RAY XM ESOPHAGUS 1CNTRST: CPT

## 2019-05-13 ENCOUNTER — OFFICE VISIT (OUTPATIENT)
Dept: PULMONOLOGY | Age: 74
End: 2019-05-13
Payer: MEDICARE

## 2019-05-13 VITALS
OXYGEN SATURATION: 96 % | RESPIRATION RATE: 16 BRPM | HEART RATE: 84 BPM | HEIGHT: 72 IN | BODY MASS INDEX: 27.09 KG/M2 | SYSTOLIC BLOOD PRESSURE: 132 MMHG | TEMPERATURE: 98.1 F | WEIGHT: 200 LBS | DIASTOLIC BLOOD PRESSURE: 76 MMHG

## 2019-05-13 DIAGNOSIS — T45.1X5A PULMONARY TOXICITY DUE TO BLEOMYCIN: Primary | ICD-10-CM

## 2019-05-13 DIAGNOSIS — J96.11 CHRONIC RESPIRATORY FAILURE WITH HYPOXIA (HCC): ICD-10-CM

## 2019-05-13 DIAGNOSIS — J98.4 PULMONARY TOXICITY DUE TO BLEOMYCIN: Primary | ICD-10-CM

## 2019-05-13 PROCEDURE — 4040F PNEUMOC VAC/ADMIN/RCVD: CPT | Performed by: INTERNAL MEDICINE

## 2019-05-13 PROCEDURE — G8417 CALC BMI ABV UP PARAM F/U: HCPCS | Performed by: INTERNAL MEDICINE

## 2019-05-13 PROCEDURE — G8427 DOCREV CUR MEDS BY ELIG CLIN: HCPCS | Performed by: INTERNAL MEDICINE

## 2019-05-13 PROCEDURE — 1123F ACP DISCUSS/DSCN MKR DOCD: CPT | Performed by: INTERNAL MEDICINE

## 2019-05-13 PROCEDURE — 1036F TOBACCO NON-USER: CPT | Performed by: INTERNAL MEDICINE

## 2019-05-13 PROCEDURE — 99213 OFFICE O/P EST LOW 20 MIN: CPT | Performed by: INTERNAL MEDICINE

## 2019-05-13 PROCEDURE — 3017F COLORECTAL CA SCREEN DOC REV: CPT | Performed by: INTERNAL MEDICINE

## 2019-05-13 NOTE — PROGRESS NOTES
REASON FOR CONSULTATION:  Chief Complaint   Patient presents with    Follow-up     drug induced fibrosis of lung         Consult at request of Cuco Delgadillo MD     PCP: Cuco Delgadillo MD    HISTORY OF PRESENT ILLNESS: Mikhail Singh is a 74y.o. year old male with a history of Hodgkin's lymphoma status post bleomycin therapy, chronic respiratory failure secondary to bleomycin pulmonary toxicity,  he is currently finishing a very prolonged taper of prednisone for a presumed exacerbation of his bleomycin pulmonary toxicity. He has had no new insults, or exposure to bleomycin, so remains unclear why his pulmonary function worsened, and CT scan showed increasing fibrosis and groundglass. At this point seems this disease has stabilized. He is currently on every OD prednisone 10 mg. PFTs are relatively stable, symptoms are much improved, he remains on 5-8 L/m, O2 with exertion. Past Medical History:   Diagnosis Date    Bleomycin lung toxicity     Lymphoma ([facility] [address]     Hodgkins stage 3       Past Surgical History:   Procedure Laterality Date    BONE MARROW BIOPSY  8/8 and 8/18/2014    Positive for lymphoma with subsequent negative    CATARACT REMOVAL Bilateral 7/23/2015    COLONOSCOPY  3/15    Dr. Johnny Goddard - [place]  8/6/14    Dr. Divina Bentley Left 06/16/2016    CERVICAL LYMPH NODE BIOPSY        TUNNELED VENOUS PORT PLACEMENT  08/14/2014    Dr. Destiney Kilgore, power port    UPPER GASTROINTESTINAL ENDOSCOPY  3/15    Dr. Johnny Goddard       family history includes No Known Problems in his brother, father, maternal aunt, maternal grandfather, maternal grandmother, maternal uncle, mother, paternal aunt, paternal grandfather, paternal grandmother, paternal uncle, sister, and another family member. SOCIAL HISTORY:   reports that he has never smoked.  He has never used smokeless tobacco.      ALLERGIES:  Patient is allergic to [employer] tartrate]; bleomycin; and xarelto [rivaroxaban]. REVIEW OF SYSTEMS:  Constitutional: Negative for fever   HENT: Negative for sore throat  Eyes: Negative for redness   Respiratory: dyspnea, cough  Cardiovascular: Negative for chest pain  Gastrointestinal: Negative for vomiting, diarrhea   Genitourinary: Negative for hematuria   Musculoskeletal: Negative for arthralgias   Skin: Negative for rash  Neurological: Negative for syncope  Hematological: Negative for adenopathy  Psychiatric/Behavorial: Negative for anxiety    Objective:   PHYSICAL EXAM:  Blood pressure 132/76, pulse 84, temperature 98.1 °F (36.7 °C), temperature source Oral, resp. rate 16, height 6' (1.829 m), weight 200 lb (90.7 kg), SpO2 96 %.'  Gen: No distress. Eyes: PERRL. No sclera icterus. No conjunctival injection. ENT: No discharge. Pharynx clear. External appearance of ears and nose normal.  Neck: Trachea midline. No obvious mass. Resp:  No wheezing, no crackles, no accessory muscle use, no dullness to percussion. CV: Regular rate. Regular rhythm. No murmur or rub. No edema. GI: Non-tender. Non-distended. No hernia. Skin: Warm, dry, normal texture and turgor. No nodule on exposed extremities. Lymph: No cervical LAD. No supraclavicular LAD. M/S: No cyanosis. No clubbing. No joint deformity. Neuro: Moves all four extremities. Psych: Oriented x 3. No anxiety. Awake. Alert. Intact judgement and insight. Current Outpatient Medications   Medication Sig Dispense Refill    predniSONE (DELTASONE) 10 MG tablet Take 10 mg by mouth every other day       levothyroxine (LEVOTHROID) 88 MCG tablet Take 1 tablet by mouth daily 90 tablet 3    aspirin 81 MG chewable tablet Take 81 mg by mouth daily      Multiple Vitamins-Minerals (MULTIVITAMIN PO) Take  by mouth. No current facility-administered medications for this visit.         Data Reviewed:   CBC and Renal reviewed  Last CBC  Lab Results   Component Value Date    WBC 10.1 02/05/2019    RBC 5.18 02/05/2019 Contrast    Narrative EXAMINATION:  CT OF THE CHEST WITHOUT CONTRAST 6/14/2016 10:18 am    TECHNIQUE:  CT of the chest was performed without the administration of intravenous  contrast. Multiplanar reformatted images are provided for review. Dose  modulation, iterative reconstruction, and/or weight based adjustment of the  mA/kV was utilized to reduce the radiation dose to as low as reasonably  achievable. COMPARISON:  05/13/2016    HISTORY:  ORDERING SYSTEM PROVIDED HISTORY: Nodular sclerosis classical Hodgkin  lymphoma of lymph nodes of multiple sites [facility])  TECHNOLOGIST PROVIDED HISTORY:  Ordering Physician Provided Reason for Exam: SOB  Acuity: Chronic  Type of Exam: Subsequent/Follow-up  Relevant Medical/Surgical History: Lymphoma, chemo,Nodular sclerosis  classical Hodgkin lymphoma of lymph nodes of multiple sites    FINDINGS:  There is increased axillary adenopathy bilaterally when compared to prior. Mediastinal adenopathy is seen and unchanged. There is involvement of the AP  window, pretracheal, subcarinal, and hilar regions. Multi focal parenchymal nodular opacities are seen throughout the lungs  bilaterally. Findings are increased. Scattered air bronchograms are seen    There is increased left-sided pleural effusion. Liver appears cirrhotic on limited images of the upper abdomen. Small stone  seen in left kidney. Adenopathy is seen in the beverly. Impression IMPRESSION:  Increased patchy and confluent nodular opacities throughout the lungs since  05/13/2016, suspicious for pneumonia in the appropriate clinical scenario. There is increased left-sided pleural effusion. Involvement of the lungs  with lymphoma is considered less likely. Increased axillary adenopathy bilaterally with persistent mediastinal  adenopathy. By report there is a history of lymphoma. Cirrhosis         CTPA: No results found for this or any previous visit.     CXR PA/LAT: No results found for this or any previous visit. CXR portable:   Results for orders placed during the hospital encounter of 06/15/16   XR Chest Portable    Narrative PORTABLE CHEST ON 6/20/2016 AT 2140 HRS. HISTORY: Reaction. A single portable view of the chest was performed. Comparison is  made to prior exam from 4 hours earlier. Left-sided Port-A-Cath is unchanged. The heart is normal in size. The trachea is midline. Patchy nodular opacities are seen in both  lungs unchanged. The costophrenic angles are clear. Impression Stable bilateral airspace opacities        PULMONARY FUNCTION TESTING RESULTS:  Date FVC (pred) L-  % FEV1(pred) L-  % FEV1/FVC  (pred) % TLC  % [id number] (pred) L- % DLCO (pred)%   4/9/19 2.51 - 62 2.19 - 72   87 56  53   1/23/19 2.58 - 63 2.21 - 71 85 68  56       Assessment/Plan:       Diagnosis Orders   1. Pulmonary toxicity due to bleomycin  Full PFT Study With Bronchodilator    OR PORTABLE OXYGEN CONCENTRATOR   2. Chronic respiratory failure with hypoxia ([address]  OR PORTABLE OXYGEN CONCENTRATOR         Problem List Items Addressed This Visit        Pulmonary Problems    Chronic respiratory failure with hypoxia (HCC)     -5-8 L per minute of O2 with exertion, tolerating room air at rest  -Has home pulse oximeter, advised to keep O2 sats greater than 88%  -Mr. Tang continues to use, and benefit from, supplemental oxygen at home. 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 It is documented to have worsening of bleomycin lung injury years after initial exposure, so this remains most likely. Less likely to be related to opt opdivo, has been on for a couple years prior to this worsening, but does remain a possibility.   We will continue to monitor as he restarts. -Repeat PFTs and follow-up in 3 months         Relevant Orders    Full PFT Study With Bronchodilator    [place] PORTABLE OXYGEN CONCENTRATOR (Completed)        Return clinic in 3 months with PFTs     This note was transcribed using [id number]. Please disregard any translational errors.     Sukumar Ricketts [address] [facility]

## 2019-05-14 ENCOUNTER — TELEPHONE (OUTPATIENT)
Dept: PULMONOLOGY | Age: 74
End: 2019-05-14

## 2019-05-14 NOTE — TELEPHONE ENCOUNTER
Thao Fountain received  order  for 5L-8L patient needs to test on 4L and de sat to 88% to see if he qualifies. They aren't able to do a PSE for something that high.

## 2019-05-14 NOTE — TELEPHONE ENCOUNTER
Left message for the patient asking if he would like to repeat the 6 mwt to see if he would qualify for a POC

## 2019-05-14 NOTE — TELEPHONE ENCOUNTER
Patient was informed at the appointment that [address] [place] [address] may not qualify at 5 liters o2.      Dr. Patino Spotted did you want to order a new walk test?

## 2019-05-20 NOTE — TELEPHONE ENCOUNTER
Phone call to patient regarding 6 min walk. He stated he will be scheduling another PFT in August before his appt with Dr. Ana West and will go from there regarding POC order.

## 2019-05-21 ENCOUNTER — HOSPITAL ENCOUNTER (OUTPATIENT)
Dept: CT IMAGING | Age: 74
Discharge: HOME | End: 2019-05-21
Payer: MEDICARE

## 2019-05-21 DIAGNOSIS — C81.90 HODGKIN LYMPHOMA, UNSPECIFIED HODGKIN LYMPHOMA TYPE, UNSPECIFIED BODY REGION (HCC): ICD-10-CM

## 2019-05-21 LAB
GFR AFRICAN AMERICAN: >60
GFR NON-AFRICAN AMERICAN: >60
PERFORMED ON: NORMAL
POC CREATININE: 0.8 MG/DL (ref 0.8–1.3)
POC SAMPLE TYPE: NORMAL

## 2019-05-21 PROCEDURE — 74177 CT ABD & PELVIS W/CONTRAST: CPT

## 2019-05-21 PROCEDURE — 6360000004 HC RX CONTRAST MEDICATION: Performed by: INTERNAL MEDICINE

## 2019-05-21 PROCEDURE — 82565 ASSAY OF CREATININE: CPT

## 2019-05-21 RX ADMIN — IOPAMIDOL 75 ML: 755 INJECTION, SOLUTION INTRAVENOUS at 09:11

## 2019-05-21 RX ADMIN — IOHEXOL 50 ML: 240 INJECTION, SOLUTION INTRATHECAL; INTRAVASCULAR; INTRAVENOUS; ORAL at 09:11

## 2019-05-24 ENCOUNTER — OFFICE VISIT (OUTPATIENT)
Dept: ORTHOPEDIC SURGERY | Age: 74
End: 2019-05-24
Payer: MEDICARE

## 2019-05-24 VITALS
DIASTOLIC BLOOD PRESSURE: 86 MMHG | HEART RATE: 83 BPM | SYSTOLIC BLOOD PRESSURE: 128 MMHG | HEIGHT: 72 IN | BODY MASS INDEX: 28.17 KG/M2 | WEIGHT: 208 LBS

## 2019-05-24 DIAGNOSIS — M75.81 TENDINITIS OF RIGHT ROTATOR CUFF: ICD-10-CM

## 2019-05-24 DIAGNOSIS — M25.511 ACUTE PAIN OF RIGHT SHOULDER: Primary | ICD-10-CM

## 2019-05-24 DIAGNOSIS — M75.41 IMPINGEMENT SYNDROME OF RIGHT SHOULDER: ICD-10-CM

## 2019-05-24 PROCEDURE — 20610 DRAIN/INJ JOINT/BURSA W/O US: CPT | Performed by: ORTHOPAEDIC SURGERY

## 2019-05-24 PROCEDURE — 4040F PNEUMOC VAC/ADMIN/RCVD: CPT | Performed by: ORTHOPAEDIC SURGERY

## 2019-05-24 PROCEDURE — G8427 DOCREV CUR MEDS BY ELIG CLIN: HCPCS | Performed by: ORTHOPAEDIC SURGERY

## 2019-05-24 PROCEDURE — 1036F TOBACCO NON-USER: CPT | Performed by: ORTHOPAEDIC SURGERY

## 2019-05-24 PROCEDURE — 1123F ACP DISCUSS/DSCN MKR DOCD: CPT | Performed by: ORTHOPAEDIC SURGERY

## 2019-05-24 PROCEDURE — G8417 CALC BMI ABV UP PARAM F/U: HCPCS | Performed by: ORTHOPAEDIC SURGERY

## 2019-05-24 PROCEDURE — 3017F COLORECTAL CA SCREEN DOC REV: CPT | Performed by: ORTHOPAEDIC SURGERY

## 2019-05-24 PROCEDURE — 99214 OFFICE O/P EST MOD 30 MIN: CPT | Performed by: ORTHOPAEDIC SURGERY

## 2019-05-24 RX ORDER — METHYLPREDNISOLONE ACETATE 40 MG/ML
80 INJECTION, SUSPENSION INTRA-ARTICULAR; INTRALESIONAL; INTRAMUSCULAR; SOFT TISSUE ONCE
Status: COMPLETED | OUTPATIENT
Start: 2019-05-24 | End: 2019-05-24

## 2019-05-24 RX ADMIN — METHYLPREDNISOLONE ACETATE 80 MG: 40 INJECTION, SUSPENSION INTRA-ARTICULAR; INTRALESIONAL; INTRAMUSCULAR; SOFT TISSUE at 11:39

## 2019-05-24 ASSESSMENT — ENCOUNTER SYMPTOMS
ALLERGIC/IMMUNOLOGIC NEGATIVE: 1
EYES NEGATIVE: 1
SHORTNESS OF BREATH: 1
GASTROINTESTINAL NEGATIVE: 1

## 2019-05-24 NOTE — PROGRESS NOTES
Subjective:      Patient ID: Irene Kuo is a 74 y.o. male. HPI  Irene Kuo presents today for evaluation of ongoing right shoulder pain. He said pain for a month associated with digging holes putting up a fence. He has pain if he lays on it at night. Pain continues bad a 7 out of 10 with movement. He has pain reaching overhead. He doesn't recall trauma. He has a known rotator cuff tear of the left shoulder that responded well to therapy. He has a 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. He is right-hand dominant. He is retired. Review of Systems   Constitutional: Positive for fatigue. HENT: Negative. Eyes: Negative. Respiratory: Positive for shortness of breath. Cardiovascular: Negative. Gastrointestinal: Negative. Endocrine: Negative. Genitourinary: Negative. Musculoskeletal: Positive for arthralgias. Right Shoulder pain   Skin: Negative. Allergic/Immunologic: Negative. Neurological: Positive for light-headedness. Hematological: Negative. Psychiatric/Behavioral: Negative. Objective:   Physical Exam  General Exam:    Vitals: Blood pressure 128/86, pulse 83, height 6' (1.829 m), weight 208 lb (94.3 kg). Constitutional: Patient is adequately groomed with no evidence of malnutrition  Mental Status: The patient is oriented to time, place and person. The patient's mood and affect are appropriate. Gait:  Patient walks with normal gait and station. Lymphatic: The lymphatic examination bilaterally reveals all areas to be without enlargement or induration. Vascular: Examination reveals no swelling or calf tenderness. Peripheral pulses are palpable and 2+. Neurological: The patient has good coordination. There is no weakness or sensory deficit. Skin:    Head/Neck: inspection reveals no rashes, ulcerations or lesions. Trunk:  inspection reveals no rashes, ulcerations or lesions.   Right Lower Extremity: inspection

## 2019-05-30 NOTE — FLOWSHEET NOTE
hearing and 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        Exercises/Interventions:   Exercise/Equipment Resistance/Repetitions Other comments   Aerobic Conditioning     Aerodyne          Stretching/PROM     Wand 10 x 10 secs at side and 90 abd    Table Slides     Wall slides  10 x 10 secs    UE Darien     Pulleys     Pendulum     BB IR 10 x 10 secs    SL IR     Pec doorway stretch     CBA stretch     UT stretch     LS stretch     Isometrics     Retraction          Weight shift     Flexion 10 x 10 secs    Abduction 10 x 10 secs    External Rotation 10 x 10 secs    Internal Rotation     Biceps     Triceps          PRE's     Flexion     Abduction     External Rotation     Internal Rotation     Shrugs     EXT     Reverse Flys     Serratus     Horizontal Abd with ER     Biceps     Triceps     Retraction          Cable Column/Theraband     External Rotation     Internal Rotation     Shrugs     Lats     Ext     Flex     Scapular Retraction 3 x 10 lime TB    BIC     TRIC     PNF          Dynamic Stability          Plyoback            Patient education: Pt was educated on PT diagnosis, prognosis, and plan of care. Pt was educated on the use of ice throughout the day. Therapeutic Exercise and NMR EXR  [x] (20188) Provided verbal/tactile cueing for activities related to strengthening, flexibility, endurance, ROM  for improvements in scapular, scapulothoracic and UE control with self care, reaching, carrying, lifting, house/yardwork, driving/computer work.    [] (76574) Provided verbal/tactile cueing for activities related to improving balance, coordination, kinesthetic sense, posture, motor skill, proprioception  to assist with  scapular, scapulothoracic and UE control with self care, reaching, carrying, lifting, house/yardwork, driving/computer work.     Therapeutic Activities:    [x] (14232 or 11180) Provided verbal/tactile cueing for activities related to improving balance, coordination, kinesthetic sense, posture, motor skill, proprioception and motor activation to allow for proper function of scapular, scapulothoracic and UE control with self care, carrying, lifting, driving/computer work. Home Exercise Program:    [x] (63471) Reviewed/Progressed HEP activities related to strengthening, flexibility, endurance, ROM of scapular, scapulothoracic and UE control with self care, reaching, carrying, lifting, house/yardwork, driving/computer work  [] (71704) Reviewed/Progressed HEP activities related to improving balance, coordination, kinesthetic sense, posture, motor skill, proprioception of scapular, scapulothoracic and UE control with self care, reaching, carrying, lifting, house/yardwork, driving/computer work      Manual Treatments:  PROM / STM / Oscillations-Mobs:  G-I, II, III, IV (PA's, Inf., Post.)  [] (51155) Provided manual therapy to mobilize soft tissue/joints of cervical/CT, scapular GHJ and UE for the purpose of modulating pain, promoting relaxation,  increasing ROM, reducing/eliminating soft tissue swelling/inflammation/restriction, improving soft tissue extensibility and allowing for proper ROM for normal function with self care, reaching, carrying, lifting, house/yardwork, driving/computer work    Modalities:      Charges:  Timed Code Treatment Minutes: 29   Total Treatment Minutes: 65     [x] EVAL (LOW) 40819   [] EVAL (MOD) 37726   [] EVAL (HIGH) 77228   [] RE-EVAL   [x] QV(01580) x     [] IONTO  [] NMR (99211) x     [] VASO  [] Manual (83442) x      [] Other:  [x] TA x      [] Mech Traction (95236)  [] ES(attended) (35118)      [] ES (un) (54189):       GOALS:Short Term Goals: To be achieved in: 2 weeks  1. Independent in HEP and progression per patient tolerance, in order to prevent re-injury. 2. Patient will have a decrease in pain to 0/10 to facilitate improvement in movement, function, and ADLs as indicated by Functional Deficits.     Long Term Goals:  To be

## 2019-05-30 NOTE — PLAN OF CARE
[facility] [address], [address] [place] [address], [address] [place] [address]  Phone: [phone]   Fax: [phone]          Physical Therapy Certification    Dear Referring Practitioner: Taylor Cockayne, MD,    We had the pleasure of evaluating the following patient for physical therapy services at [address] [place] [address]. A summary of our findings can be found in the initial assessment below. This includes our plan of care. If you have any questions or concerns regarding these findings, please do not hesitate to contact me at the office phone number checked above. Thank you for the referral.       Physician Signature:_______________________________Date:__________________  By signing above (or electronic signature), therapists plan is approved by physician      Patient: Yoni Wong   : 1945   MRN: [id number]  Referring Physician: Referring Practitioner: Taylor Cockayne, MD      Evaluation Date: 2019      Medical Diagnosis Information:  Diagnosis: V17.370 (ICD-10-CM) - Acute pain of right shoulder; M75.41 (ICD-10-CM) - Impingement syndrome of right shoulder; M75.81 (ICD-10-CM) - Tendinitis of right rotator cuff   Treatment Diagnosis: M25.511 (ICD-10-CM) - Acute pain of right shoulder; M62.81 muscle weakness                                           Precautions/ Contra-indications: hard of hearing; 65% lung function  Latex Allergy:  [x]NO      []YES  Preferred Language for Healthcare:   [x]English       []other:    SUBJECTIVE: Patient stated complaint: Pt presents for R shoulder pain. He had pain for a month with digging holes and putting up a fence. He got sore. He also has pain laying on it at night and reaching overhead. X-ray showed some degeneration. Pt is R handed. Pt was given a cortisone injection at MD appt and this did help some. Pt reports his L shoulder is still doing good since being in PT last time.      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; hearing difficulties    Functional Disability Index: UEFI- 67.5%  Relevant Medication:   See intake form;   Current pain: 0/10 Top and posterior shoulder  Pain at worst:  9/10 with movement  Pain at best:  0/10    Easing factors: rest  Provocative factors:  laying on it at night and reaching overhead; lifting overhead, doing chores and using tools    Type: []Constant   [x]Intermittent  []Radiating [x]Localized []other:     Numbness/Tingling: none    Functional Limitations/Impairments: [x]Lifting/reaching []Grooming [x]Carrying    [x]ADL's []Driving [x]Sports/Recreations   []Other:    Occupation/School: retired; active with yard work     Living Status/Prior Level of Function: Independent with ADLs and IADLs, without pain in R shoulder    OBJECTIVE:     CERV ROM     Cervical Flexion Tight but no pain    Cervical Extension \"    Cervical SB \"    Cervical rotation \"        ROM PROM AROM  Comment    L R L R    Flexion   155 143    Abduction   180 175    ER   81 at side; 70 at 90 abd 68 at side; 62 at 90 abd    IR   BB to T8 BB to L4    Other        Other             Strength L R Comment   Flexion 5 4+ pain    Abduction 5 4 pain    ER 4+ 4- pain    IR 5 5    Supraspinatus      Upper Trap      Lower Trap      Mid Trap      Rhomboids      Biceps 5 5 sore    Triceps 5 5 sore    Horizontal Abduction      Horizontal Adduction      Lats        Orthopedic Special Tests:   Special Tests Left Right   Apley Scratch IR:  ER:   Cross body: IR:  ER:  Cross Body:   Neer's  +   Full Can     Empty Can     Jennifer Shake  +   Nerve Tension Testing     Speed's     Moreno's      Spurling's     Repeated Scaption     Drop Arm (supraspinatus) - -   ER lag sign (infraspinatus) - -   Belly Press (subscapularis - _   Lift off (subscapularis) - -   Apprehension test     ER internal impingement test Reflexes/Sensation (myotomes/dermatomes): n/t    Joint mobility:    [x]Normal    []Hypo   []Hyper    Palpation: tender at anterior shoulder [facility] and mild tender posterior shoulder    Functional Mobility/Transfers: Indep    Posture: poor with significant forward shoulders and kyphosis    Bandages/Dressings/Incisions: n/a    Gait: (include devices/WB status) Indep              Review Of Systems (ROS):  [x]Performed Review of systems (Integumentary, CardioPulmonary, Neurological) by intake and observation. Intake form has been scanned into medical record. Patient has been instructed to contact their primary care physician regarding ROS issues if not already being addressed at this time.       Co-morbidities/Complexities (which will affect course of rehabilitation):   []None           Arthritic conditions   []Rheumatoid arthritis (M05.9)  [x]Osteoarthritis (M19.91)   Cardiovascular conditions   []Hypertension (I10)  []Hyperlipidemia (E78.5)  []Angina pectoris (I20)  []Atherosclerosis (I70)   Musculoskeletal conditions   []Disc pathology   []Congenital spine pathologies   []Prior surgical intervention  []Osteoporosis (M81.8)  []Osteopenia (M85.8)   Endocrine conditions   []Hypothyroid (E03.9)  []Hyperthyroid Gastrointestinal conditions   []Constipation (I00.03)   Metabolic conditions   []Morbid obesity (E66.01)  []Diabetes type 1(E10.65) or 2 (E11.65)   []Neuropathy (G60.9)     Pulmonary conditions   []Asthma (J45)  []Coughing   []COPD (J44.9)   Psychological Disorders  []Anxiety (F41.9)  []Depression (F32.9)   []Other:   [x]Other:  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 [] Falls Risk assessed and Patient requires intervention due to being higher risk   TUG score (>12s at risk):     [] Falls education provided, including         ASSESSMENT: Pt is a 74y.o. year old male with signs and symptoms consistent with R shoulder pain and RC tendonitis and shoulder impingement. Pt presents with decreased R UE strength; decreased R UE ROM; increased pain; decreased flexibility; poor posture; and decreased functional mobility and ADLs. Pt will benefit from skilled physical therapy services to address above limitations through strengthening, stretching, ROM exercises, modalities as need for pain control, posture education, instruction on HEP, and education for return to functional mobility.      Functional Impairments   []Noted spinal or UE joint hypomobility   []Noted spinal or UE joint hypermobility   [x]Decreased UE functional ROM   [x]Decreased UE functional strength   []Abnormal reflexes/sensation/myotomal/dermatomal deficits   [x]Decreased RC/scapular/core strength and neuromuscular control   []other:      Functional Activity Limitations (from functional questionnaire and intake)   []Reduced ability to tolerate prolonged functional positions   []Reduced ability or difficulty with changes of positions or transfers between positions   []Reduced ability to maintain good posture and demonstrate good body mechanics with sitting, bending, and lifting   [] Reduced ability or tolerance with driving and/or computer work   []Reduced ability to sleep   [x]Reduced ability to perform lifting, reaching, carrying tasks   [x]Reduced ability to tolerate impact through UE   [x]Reduced ability to reach behind back   []Reduced ability to  or hold objects   [x]Reduced ability to throw or toss an object   []other:    Participation Restrictions   []Reduced participation in self care activities   [x]Reduced participation in home management activities   []Reduced participation in work activities   []Reduced participation in social activities. [x]Reduced participation in sport/recreation activities. Classification:   []Signs/symptoms consistent with post-surgical status including decreased ROM, strength and function. []Signs/symptoms consistent with joint sprain/strain   [x]Signs/symptoms consistent with shoulder impingement   [x]Signs/symptoms consistent with shoulder/elbow/wrist tendinopathy   []Signs/symptoms consistent with Rotator cuff tear   []Signs/symptoms consistent with labral tear   [x]Signs/symptoms consistent with postural dysfunction    []Signs/symptoms consistent with Glenohumeral IR Deficit - <45 degrees   []Signs/symptoms consistent with facet dysfunction of cervical/thoracic spine    []Signs/symptoms consistent with pathology which may benefit from Dry needling     []other:     Prognosis/Rehab Potential:      []Excellent   [x]Good    []Fair   []Poor    Tolerance of evaluation/treatment:    []Excellent   [x]Good    []Fair   []Poor    PLAN:  Frequency/Duration:  2 days per week for 6 Weeks:  INTERVENTIONS:  [x] Therapeutic exercise including: strength training, ROM, for Upper extremity and core   [x]  NMR activation and proprioception for UE, scap and Core   [x] Manual therapy as indicated for shoulder, scapula and spine to include: Dry Needling/IASTM, STM, PROM, Gr I-IV mobilizations, manipulation. [x] Modalities as needed that may include: thermal agents, E-stim, Biofeedback, US, iontophoresis as indicated  [x] Patient education on joint protection, postural re-education, activity modification, progression of HEP.     HEP instruction: Pt was instructed in, and safely and correctly demonstrated home exercise program. Patient verbalized understanding of proper frequency of exercises. Copy of exercises was scanned into patient chart and can be fount in the media file. GOALS:  Patient stated goal: make it start hurting and get shoulder back to yse    Therapist goals for Patient:   Short Term Goals: To be achieved in: 2 weeks  1. Independent in HEP and progression per patient tolerance, in order to prevent re-injury. 2. Patient will have a decrease in pain to 0/10 to facilitate improvement in movement, function, and ADLs as indicated by Functional Deficits. Long Term Goals: To be achieved in: 6 weeks  1. Disability index score of 78% or more for the UEFI to assist with reaching prior level of function. 2. Patient will demonstrate increased R shoulder AROM to BB to L1 and flex to 150  to allow for proper joint functioning as indicated by patients Functional Deficits. 3. Patient will demonstrate an increase in R shoulder strength to 4+/5 flex, abd, and ER to allow for proper functional mobility as indicated by patients Functional Deficits. 4. Patient will return to yard work without increased symptoms or restriction. 5. Pt will be Indep with ADLs above shoulder height without pain in R shoulder.    Physical Therapy Evaluation Complexity Justification  [x] A history of present problem with:  [] no personal factors and/or comorbidities that impact the plan of care;  []1-2 personal factors and/or comorbidities that impact the plan of care  [x]3 personal factors and/or comorbidities that impact the plan of care  [x] An examination of body systems using standardized tests and measures addressing any of the following: body structures and functions (impairments), activity limitations, and/or participation restrictions;:  [] a total of 1-2 or more elements   [] a total of 3 or more elements   [x] a total of 4 or more elements   [x] A clinical presentation with:  [x] stable and/or uncomplicated characteristics   [] evolving clinical presentation with changing characteristics  [] unstable and unpredictable characteristics;   [x] Clinical decision making of [x] low, [] moderate, [] high complexity using standardized patient assessment instrument and/or measurable assessment of functional outcome.     [x] EVAL (LOW) 77541 (typically 20 minutes face-to-face)  [] EVAL (MOD) 77657 (typically 30 minutes face-to-face)  [] EVAL (HIGH) 27844 (typically 45 minutes face-to-face)  [] RE-EVAL 46334    Electronically signed by:  Mark Brooks, PT, DPT

## 2019-05-31 ENCOUNTER — HOSPITAL ENCOUNTER (OUTPATIENT)
Dept: PHYSICAL THERAPY | Age: 74
Setting detail: THERAPIES SERIES
Discharge: HOME | End: 2019-05-31
Payer: MEDICARE

## 2019-05-31 PROCEDURE — 97530 THERAPEUTIC ACTIVITIES: CPT | Performed by: PHYSICAL THERAPIST

## 2019-05-31 PROCEDURE — 97110 THERAPEUTIC EXERCISES: CPT | Performed by: PHYSICAL THERAPIST

## 2019-05-31 PROCEDURE — 97161 PT EVAL LOW COMPLEX 20 MIN: CPT | Performed by: PHYSICAL THERAPIST

## 2019-06-04 ENCOUNTER — HOSPITAL ENCOUNTER (OUTPATIENT)
Dept: PHYSICAL THERAPY | Age: 74
Setting detail: THERAPIES SERIES
Discharge: HOME | End: 2019-06-04
Payer: MEDICARE

## 2019-06-04 PROCEDURE — 97110 THERAPEUTIC EXERCISES: CPT | Performed by: PHYSICAL THERAPIST

## 2019-06-04 PROCEDURE — 97112 NEUROMUSCULAR REEDUCATION: CPT | Performed by: PHYSICAL THERAPIST

## 2019-06-04 NOTE — FLOWSHEET NOTE
[address] [facility], [place]                                                         Physical Therapy Daily Treatment Note  Date:  2019    Patient Name:  Jay Clay    :  1945  MRN: [id number]    Medical/Treatment Diagnosis Information:  · Diagnosis: M25.511 (ICD-10-CM) - Acute pain of right shoulder; M75.41 (ICD-10-CM) - Impingement syndrome of right shoulder; M75.81 (ICD-10-CM) - Tendinitis of right rotator cuff  · Treatment Diagnosis: M25.511 (ICD-10-CM) - Acute pain of right shoulder; M62.81 muscle weakness  Insurance/Certification information:  PT Insurance Information: Medicare  Physician Information:  Referring Practitioner: Sana Velázquez MD  Plan of care signed (Y/N): Y    Date of Patient follow up with Physician:       Progress Note: [x]  Yes  []  No  Next due by: Visit #10      Latex Allergy:  [x]NO      []YES  Preferred Language for Healthcare:   [x]English       []other:    Visit # Insurance Allowable   2 Medicare     Pain level:  0-9/10 On 2019    SUBJECTIVE:  Pt is stiff but it moves. He has been working outside. He has been doing weed-eater outside. When he rolls his shoulder it clicks sometimes. He has been doing exercises.      OBJECTIVE: See eval   Observation:    Test measurements:       CERV ROM       Cervical Flexion Tight but no pain     Cervical Extension \"     Cervical SB \"     Cervical rotation \"                   ROM PROM AROM  Comment     L R L R     Flexion     155 143     Abduction     180 175     ER     81 at side; 70 at 90 abd 68 at side; 62 at 90 abd     IR     BB to T8 BB to L4     Other             Other                    Strength L R Comment   Flexion 5 4+ pain     Abduction 5 4 pain     ER 4+ 4- pain     IR 5 5     Supraspinatus         Upper Trap         Lower Trap         Mid Trap         Rhomboids         Biceps 5 5 sore     Triceps 5 5 sore     Horizontal Abduction         Horizontal Adduction         Lats                    Functional assessment on 5/30/2019   UEFI- 67.5%          RESTRICTIONS/PRECAUTIONS: hard of hearing and 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        Exercises/Interventions:   Exercise/Equipment Resistance/Repetitions Other comments   Aerobic Conditioning     Aerodyne          Stretching/PROM     Wand 10 x 10 secs at side and 90 abd    Table Slides     Wall slides  10 x 10 secs    UE [place]     Pulleys     Pendulum     BB IR 10 x 10 secs    SL IR     Pec doorway stretch     CBA stretch     UT stretch     LS stretch     Isometrics     Retraction          Weight shift     Flexion    Abduction    External Rotation    Internal Rotation     Biceps     Triceps          PRE's     Flexion     Abduction     External Rotation     Internal Rotation     Shrugs     EXT     Reverse Flys     Serratus 3 x 10     Horizontal Abd with ER     Biceps 3 x 10 3 lbs    Triceps     Retraction          Cable Column/Theraband     External Rotation 3 x 10 lime TB    Internal Rotation Attempted but painful    Shrugs     Lats     Ext 3 x 10 lime TB    Flex     Scapular Retraction 3 x 10 blue TB    BIC     TRIC     PNF          Dynamic Stability     UK flex X 5 mins supine 120 flex    Plyoback            Patient education: Pt was educated on PT diagnosis, prognosis, and plan of care. Pt was educated on the use of ice throughout the day.        Therapeutic Exercise and NMR EXR  [x] (55232) Provided verbal/tactile cueing for activities related to strengthening, flexibility, endurance, ROM  for improvements in scapular, scapulothoracic and UE control with self care, reaching, carrying, lifting, house/yardwork, driving/computer work.    [] (58788) Provided verbal/tactile cueing for activities related to improving balance, coordination, kinesthetic sense, posture, motor skill, proprioception  to assist with  scapular, scapulothoracic and UE control with self care, reaching, carrying, lifting, house/yardwork, driving/computer work. Therapeutic Activities:    [x] (94040 or 24434) Provided verbal/tactile cueing for activities related to improving balance, coordination, kinesthetic sense, posture, motor skill, proprioception and motor activation to allow for proper function of scapular, scapulothoracic and UE control with self care, carrying, lifting, driving/computer work. Home Exercise Program:    [x] (86494) Reviewed/Progressed HEP activities related to strengthening, flexibility, endurance, ROM of scapular, scapulothoracic and UE control with self care, reaching, carrying, lifting, house/yardwork, driving/computer work  [] (74227) Reviewed/Progressed HEP activities related to improving balance, coordination, kinesthetic sense, posture, motor skill, proprioception of scapular, scapulothoracic and UE control with self care, reaching, carrying, lifting, house/yardwork, driving/computer work      Manual Treatments:  PROM / STM / Oscillations-Mobs:  G-I, II, III, IV (PA's, Inf., Post.)  [] (45309) Provided manual therapy to mobilize soft tissue/joints of cervical/CT, scapular GHJ and UE for the purpose of modulating pain, promoting relaxation,  increasing ROM, reducing/eliminating soft tissue swelling/inflammation/restriction, improving soft tissue extensibility and allowing for proper ROM for normal function with self care, reaching, carrying, lifting, house/yardwork, driving/computer work    Modalities:  Ice x 15 mins to R shoulder    Charges:  Timed Code Treatment Minutes: 24   Total Treatment Minutes: 48     [] EVAL (LOW) 33124   [] EVAL (MOD) 08269   [] EVAL (HIGH) 94390   [] RE-EVAL   [x] QG(71850) x1     [] IONTO  [x] NMR (76961) x1     [] VASO  [] Manual (02159) x      [] Other:  [] TA x      [] Mech Traction (82785)  [] ES(attended) (16657)      [] ES (un) (19993):       GOALS:Short Term Goals: To be achieved in: 2 weeks  1.  Independent in HEP and plan of care [] Alter current plan (see comments)  [] Plan of care initiated [] Hold pending MD visit [] Discharge    Electronically signed by: Jeff Rojas PT, DPT

## 2019-06-07 ENCOUNTER — HOSPITAL ENCOUNTER (OUTPATIENT)
Dept: PHYSICAL THERAPY | Age: 74
Setting detail: THERAPIES SERIES
Discharge: HOME | End: 2019-06-07
Payer: MEDICARE

## 2019-06-07 PROCEDURE — 97112 NEUROMUSCULAR REEDUCATION: CPT | Performed by: PHYSICAL THERAPIST

## 2019-06-07 PROCEDURE — 97110 THERAPEUTIC EXERCISES: CPT | Performed by: PHYSICAL THERAPIST

## 2019-06-07 NOTE — FLOWSHEET NOTE
[address] [facility], [place]                                                         Physical Therapy Daily Treatment Note  Date:  2019    Patient Name:  Jay Clay    :  1945  MRN: [id number]    Medical/Treatment Diagnosis Information:  · Diagnosis: M25.511 (ICD-10-CM) - Acute pain of right shoulder; M75.41 (ICD-10-CM) - Impingement syndrome of right shoulder; M75.81 (ICD-10-CM) - Tendinitis of right rotator cuff  · Treatment Diagnosis: M25.511 (ICD-10-CM) - Acute pain of right shoulder; M62.81 muscle weakness  Insurance/Certification information:  PT Insurance Information: Medicare  Physician Information:  Referring Practitioner: Sana Velázquez MD  Plan of care signed (Y/N): Y    Date of Patient follow up with Physician:       Progress Note: [x]  Yes  []  No  Next due by: Visit #10      Latex Allergy:  [x]NO      []YES  Preferred Language for Healthcare:   [x]English       []other:    Visit # Insurance Allowable   3 Medicare     Pain level:  0/10 On 2019    SUBJECTIVE:  Pt reports that he is feeling a little better. Still has pain but not as soon in his ROM. He is still waiting to put his posts in his holes.      OBJECTIVE: See eval   Observation:    Test measurements:       CERV ROM       Cervical Flexion Tight but no pain     Cervical Extension \"     Cervical SB \"     Cervical rotation \"                   ROM PROM AROM  Comment     L R L R     Flexion     155 143     Abduction     180 175     ER     81 at side; 70 at 90 abd 68 at side; 62 at 90 abd     IR     BB to T8 BB to L4     Other             Other                    Strength L R Comment   Flexion 5 4+ pain     Abduction 5 4 pain     ER 4+ 4- pain     IR 5 5     Supraspinatus         Upper Trap         Lower Trap         Mid Trap         Rhomboids         Biceps 5 5 sore     Triceps 5 5 sore     Horizontal Abduction         Horizontal Adduction         Lats                    Functional assessment on 5/30/2019   UEFI- 67.5%          RESTRICTIONS/PRECAUTIONS: hard of hearing and 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        Exercises/Interventions:   Exercise/Equipment Resistance/Repetitions Other comments   Aerobic Conditioning     Aerodyne          Stretching/PROM     Wand 10 x 10 secs at side and 90 abd    Table Slides     Wall slides  10 x 10 secs    UE [place]     Pulleys     Pendulum     BB IR 10 x 10 secs    SL IR     Pec doorway stretch     CBA stretch     UT stretch     LS stretch     Isometrics     Retraction          Weight shift     Flexion    Abduction    External Rotation    Internal Rotation     Biceps     Triceps          PRE's     Flexion     Abduction     External Rotation     Internal Rotation     Shrugs     EXT     Reverse Flys     Serratus 3 x 10 3 lbs    Horizontal Abd with ER     Biceps 3 x 10 3 lbs    Triceps     Retraction          Cable Column/Theraband     External Rotation 3 x 10 lime TB    Internal Rotation Attempted but painful    Shrugs     Lats     Ext 3 x 10 lime TB    Flex     Scapular Retraction 4 x 10 blue TB    BIC     TRIC 4 x 10 black TB    PNF          Dynamic Stability     UK flex X 5 mins supine 120 flex    Plyoback            Patient education: Pt was educated on PT diagnosis, prognosis, and plan of care. Pt was educated on the use of ice throughout the day.        Therapeutic Exercise and NMR EXR  [x] (15766) Provided verbal/tactile cueing for activities related to strengthening, flexibility, endurance, ROM  for improvements in scapular, scapulothoracic and UE control with self care, reaching, carrying, lifting, house/yardwork, driving/computer work.    [] (84237) Provided verbal/tactile cueing for activities related to improving balance, coordination, kinesthetic sense, posture, motor skill, proprioception  to assist with  scapular, scapulothoracic and UE control with self care, reaching, carrying, lifting, house/yardwork, driving/computer work. Therapeutic Activities:    [x] (92751 or 38917) Provided verbal/tactile cueing for activities related to improving balance, coordination, kinesthetic sense, posture, motor skill, proprioception and motor activation to allow for proper function of scapular, scapulothoracic and UE control with self care, carrying, lifting, driving/computer work. Home Exercise Program:    [x] (68818) Reviewed/Progressed HEP activities related to strengthening, flexibility, endurance, ROM of scapular, scapulothoracic and UE control with self care, reaching, carrying, lifting, house/yardwork, driving/computer work  [] (09468) Reviewed/Progressed HEP activities related to improving balance, coordination, kinesthetic sense, posture, motor skill, proprioception of scapular, scapulothoracic and UE control with self care, reaching, carrying, lifting, house/yardwork, driving/computer work      Manual Treatments:  PROM / STM / Oscillations-Mobs:  G-I, II, III, IV (PA's, Inf., Post.)  [] (65865) Provided manual therapy to mobilize soft tissue/joints of cervical/CT, scapular GHJ and UE for the purpose of modulating pain, promoting relaxation,  increasing ROM, reducing/eliminating soft tissue swelling/inflammation/restriction, improving soft tissue extensibility and allowing for proper ROM for normal function with self care, reaching, carrying, lifting, house/yardwork, driving/computer work    Modalities:  Ice x 15 mins to R shoulder    Charges:  Timed Code Treatment Minutes: 26   Total Treatment Minutes: 58     [] EVAL (LOW) 25358   [] EVAL (MOD) 42501   [] EVAL (HIGH) 94213   [] RE-EVAL   [x] PN(71247) x1     [] IONTO  [x] NMR (89177) x1     [] VASO  [] Manual (19823) x      [] Other:  [] TA x      [] Mech Traction (31118)  [] ES(attended) (07380)      [] ES (un) (62992):       GOALS:Short Term Goals: To be achieved in: 2 weeks  1.  Independent in HEP and progression per

## 2019-06-11 ENCOUNTER — HOSPITAL ENCOUNTER (OUTPATIENT)
Dept: PHYSICAL THERAPY | Age: 74
Setting detail: THERAPIES SERIES
Discharge: HOME | End: 2019-06-11
Payer: MEDICARE

## 2019-06-11 PROCEDURE — 97112 NEUROMUSCULAR REEDUCATION: CPT | Performed by: PHYSICAL THERAPIST

## 2019-06-11 PROCEDURE — 97110 THERAPEUTIC EXERCISES: CPT | Performed by: PHYSICAL THERAPIST

## 2019-06-11 NOTE — FLOWSHEET NOTE
[address] [facility], [place]                                                         Physical Therapy Daily Treatment Note  Date:  2019    Patient Name:  Kiki Keith    :  1945  MRN: [id number]    Medical/Treatment Diagnosis Information:  · Diagnosis: M25.511 (ICD-10-CM) - Acute pain of right shoulder; M75.41 (ICD-10-CM) - Impingement syndrome of right shoulder; M75.81 (ICD-10-CM) - Tendinitis of right rotator cuff  · Treatment Diagnosis: M25.511 (ICD-10-CM) - Acute pain of right shoulder; M62.81 muscle weakness  Insurance/Certification information:  PT Insurance Information: Medicare  Physician Information:  Referring Practitioner: Belkys Campbell MD  Plan of care signed (Y/N): Y    Date of Patient follow up with Physician:       Progress Note: [x]  Yes  []  No  Next due by: Visit #10      Latex Allergy:  [x]NO      []YES  Preferred Language for Healthcare:   [x]English       []other:    Visit # Insurance Allowable   4 Medicare     Pain level:  0/10 On 2019    SUBJECTIVE: Pt reports overall shoulder is feeling better. He has been resting. He can feel it more when he over does it. He has a sore spot still at top of shoulder. Exercises are going pretty good.      OBJECTIVE: See eval    Observation:    Test measurements:       CERV ROM       Cervical Flexion Tight but no pain     Cervical Extension \"     Cervical SB \"     Cervical rotation \"                   ROM PROM AROM  Comment     L R L R- 19     Flexion     155     Abduction     180     ER     81 at side; 70 at 90 abd 65 at side; 66 at 90 abd     IR     BB to T8      Other             Other                    Strength L R Comment   Flexion 5 4+ pain     Abduction 5 4 pain     ER 4+ 4- pain     IR 5 5     Supraspinatus         Upper Trap         Lower Trap         Mid Trap         Rhomboids         Biceps 5 5 sore     Triceps 5 5 sore     Horizontal Abduction         Horizontal Adduction         Lats               Functional assessment on 5/30/2019   UEFI- 67.5%          RESTRICTIONS/PRECAUTIONS: hard of hearing and 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        Exercises/Interventions:   Exercise/Equipment Resistance/Repetitions Other comments   Aerobic Conditioning     Aerodyne          Stretching/PROM     Wand 10 x 10 secs at side and 90 abd    Table Slides     Wall slides  10 x 10 secs    UE Inglis     Pulleys     Pendulum     BB IR 10 x 10 secs    SL IR     Pec doorway stretch     CBA stretch     UT stretch     LS stretch     Isometrics     Retraction          Weight shift     Flexion    Abduction    External Rotation    Internal Rotation     Biceps     Triceps          PRE's     Flexion 3 x 10 standing     Abduction     External Rotation     Internal Rotation     Shrugs     EXT     Reverse Flys     Serratus 3 x 10 3 lbs    Horizontal Abd with ER     Biceps 3 x 10 3 lbs    Triceps     Retraction          Cable Column/Theraband     External Rotation 3 x 10 lime TB    Internal Rotation Attempted but painful    Shrugs     Lats     Ext 3 x 10 blue TB    Flex     Scapular Retraction 3 x 10 greyTB    BIC     TRIC 3 x 10 black TB    PNF          Dynamic Stability     UK flex X 5 mins supine 120 flex, 1 lb    Plyoback            Patient education: Pt was educated on PT diagnosis, prognosis, and plan of care. Pt was educated on the use of ice throughout the day.        Therapeutic Exercise and NMR EXR  [x] (27850) Provided verbal/tactile cueing for activities related to strengthening, flexibility, endurance, ROM  for improvements in scapular, scapulothoracic and UE control with self care, reaching, carrying, lifting, house/yardwork, driving/computer work.    [] (72485) Provided verbal/tactile cueing for activities related to improving balance, coordination, kinesthetic sense, posture, motor skill, proprioception  to assist with scapular, scapulothoracic and UE control with self care, reaching, carrying, lifting, house/yardwork, driving/computer work. Therapeutic Activities:    [x] (46104 or 74019) Provided verbal/tactile cueing for activities related to improving balance, coordination, kinesthetic sense, posture, motor skill, proprioception and motor activation to allow for proper function of scapular, scapulothoracic and UE control with self care, carrying, lifting, driving/computer work. Home Exercise Program:    [x] (35492) Reviewed/Progressed HEP activities related to strengthening, flexibility, endurance, ROM of scapular, scapulothoracic and UE control with self care, reaching, carrying, lifting, house/yardwork, driving/computer work  [] (55697) Reviewed/Progressed HEP activities related to improving balance, coordination, kinesthetic sense, posture, motor skill, proprioception of scapular, scapulothoracic and UE control with self care, reaching, carrying, lifting, house/yardwork, driving/computer work      Manual Treatments:  PROM / STM / Oscillations-Mobs:  G-I, II, III, IV (PA's, Inf., Post.)  [] (03671) Provided manual therapy to mobilize soft tissue/joints of cervical/CT, scapular GHJ and UE for the purpose of modulating pain, promoting relaxation,  increasing ROM, reducing/eliminating soft tissue swelling/inflammation/restriction, improving soft tissue extensibility and allowing for proper ROM for normal function with self care, reaching, carrying, lifting, house/yardwork, driving/computer work    Modalities:  Ice x 15 mins to R shoulder    Charges:  Timed Code Treatment Minutes: 23   Total Treatment Minutes: 64     [] EVAL (LOW) 93400   [] EVAL (MOD) 26347   [] EVAL (HIGH) 85476   [] RE-EVAL   [x] KQ(74873) x1     [] IONTO  [x] NMR (80744) x1     [] VASO  [] Manual (21879) x      [] Other:  [] TA x      [] Mech Traction (69025)  [] ES(attended) (98158)      [] ES (un) (19856):       GOALS:Short Term Goals:  To be achieved in: 2 weeks  1. Independent in HEP and progression per patient tolerance, in order to prevent re-injury. 2. Patient will have a decrease in pain to 0/10 to facilitate improvement in movement, function, and ADLs as indicated by Functional Deficits.     Long Term Goals: To be achieved in: 6 weeks  1. Disability index score of 78% or more for the UEFI to assist with reaching prior level of function. 2. Patient will demonstrate increased R shoulder AROM to BB to L1 and flex to 150  to allow for proper joint functioning as indicated by patients Functional Deficits. 3. Patient will demonstrate an increase in R shoulder strength to 4+/5 flex, abd, and ER to allow for proper functional mobility as indicated by patients Functional Deficits. 4. Patient will return to yard work without increased symptoms or restriction. 5. Pt will be Indep with ADLs above shoulder height without pain in R shoulder. Progression Towards Functional goals:  [] Patient is progressing as expected towards functional goals listed. [] Progression is slowed due to complexities listed. [] Progression has been slowed due to co-morbidities. [x] Plan just implemented, too soon to assess goals progression  [] Other:     ASSESSMENT:  See eval    Treatment/Activity Tolerance:  [x] Patient tolerated treatment well [] Patient limited by fatique  [] Patient limited by pain  [] Patient limited by other medical complications  [x] Other: Pt was fatigued with standing scaption exercise and with TB ER. He was able to add 1 lb to [employer] [place] flex today. He was told to avoid end-range motion due to pain at top of arc with this. He continues to be tight in ER at side and made some improvement in ER at 90 today.      Prognosis: [x] Good [] Fair  [] Poor    Patient Requires Follow-up: [x] Yes  [] No    PLAN: See eval; measure ROM; add scap to HEP  [x] Continue per plan of care [] Alter current plan (see comments)  [] Plan of care initiated [] Hold pending MD visit [] Discharge    Electronically signed by: Zabrina Barrett PT, DPT

## 2019-06-14 ENCOUNTER — HOSPITAL ENCOUNTER (OUTPATIENT)
Dept: PHYSICAL THERAPY | Age: 74
Setting detail: THERAPIES SERIES
Discharge: HOME | End: 2019-06-14
Payer: MEDICARE

## 2019-06-14 PROCEDURE — 97110 THERAPEUTIC EXERCISES: CPT | Performed by: PHYSICAL THERAPIST

## 2019-06-14 PROCEDURE — 97112 NEUROMUSCULAR REEDUCATION: CPT | Performed by: PHYSICAL THERAPIST

## 2019-06-14 NOTE — FLOWSHEET NOTE
[address] [facility], [place]                                                         Physical Therapy Daily Treatment Note  Date:  2019    Patient Name:  Urszula Gunn    :  1945  MRN: [id number]    Medical/Treatment Diagnosis Information:  · Diagnosis: M25.511 (ICD-10-CM) - Acute pain of right shoulder; M75.41 (ICD-10-CM) - Impingement syndrome of right shoulder; M75.81 (ICD-10-CM) - Tendinitis of right rotator cuff  · Treatment Diagnosis: M25.511 (ICD-10-CM) - Acute pain of right shoulder; M62.81 muscle weakness  Insurance/Certification information:  PT Insurance Information: Medicare  Physician Information:  Referring Practitioner: Cody Katz MD  Plan of care signed (Y/N): Y    Date of Patient follow up with Physician:       Progress Note: [x]  Yes  []  No  Next due by: Visit #10      Latex Allergy:  [x]NO      []YES  Preferred Language for Healthcare:   [x]English       []other:    Visit # Insurance Allowable   5 Medicare     Pain level:  0/10 On 2019    SUBJECTIVE: Pt did a couple hours of using weed-eater and yard work and reports he felt better with this that he did before PT. He still feels his shoulder when he reaches up above head though with a point of pain in there.      OBJECTIVE: See eval    Observation:    Test measurements:       CERV ROM       Cervical Flexion Tight but no pain     Cervical Extension \"     Cervical SB \"     Cervical rotation \"                   ROM PROM AROM  Comment     L R L R- 19     Flexion     155     Abduction     180     ER     81 at side; 70 at 90 abd 65 at side; 66 at 90 abd     IR     BB to T8      Other             Other                    Strength L R Comment   Flexion 5 4+ pain     Abduction 5 4 pain     ER 4+ 4- pain     IR 5 5     Supraspinatus         Upper Trap         Lower Trap         Mid Trap         Rhomboids         Biceps 5 5 sore     Triceps 5 5 sore     Horizontal Abduction         Horizontal Adduction         Lats               Functional assessment on 5/30/2019   UEFI- 67.5%          RESTRICTIONS/PRECAUTIONS: hard of hearing and 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        Exercises/Interventions:   Exercise/Equipment Resistance/Repetitions Other comments   Aerobic Conditioning     Aerodyne          Stretching/PROM     Wand 10 x 10 secs at side and 90 abd    Table Slides     Wall slides  10 x 10 secs    UE Oliver     Pulleys     Pendulum     BB IR 10 x 10 secs    SL IR     Pec doorway stretch     CBA stretch     UT stretch     LS stretch     Isometrics     Retraction          Weight shift     Flexion    Abduction    External Rotation    Internal Rotation     Biceps     Triceps          PRE's     Flexion 3 x 10 standing     Abduction     External Rotation     Internal Rotation     Shrugs     EXT     Reverse Flys     Serratus 3 x 10 3 lbs    Horizontal Abd with ER     Biceps 3 x 10 3 lbs    Triceps     Retraction          Cable Column/Theraband     External Rotation 3 x 10 lime TB    Internal Rotation Attempted but painful    Shrugs     Lats     Ext 3 x 10 blue TB    Flex     Scapular Retraction 3 x 10 greyTB    BIC     TRIC 3 x 10 black TB    PNF          Dynamic Stability     UK flex X 5 mins supine 120 flex, 1 lb    Plyoback            Patient education: Pt was educated on PT diagnosis, prognosis, and plan of care. Pt was educated on the use of ice throughout the day.        Therapeutic Exercise and NMR EXR  [x] (81114) Provided verbal/tactile cueing for activities related to strengthening, flexibility, endurance, ROM  for improvements in scapular, scapulothoracic and UE control with self care, reaching, carrying, lifting, house/yardwork, driving/computer work.    [] (10088) Provided verbal/tactile cueing for activities related to improving balance, coordination, kinesthetic sense, posture, motor skill, proprioception  to assist with  scapular, scapulothoracic and UE control with self care, reaching, carrying, lifting, house/yardwork, driving/computer work. Therapeutic Activities:    [x] (77053 or 62827) Provided verbal/tactile cueing for activities related to improving balance, coordination, kinesthetic sense, posture, motor skill, proprioception and motor activation to allow for proper function of scapular, scapulothoracic and UE control with self care, carrying, lifting, driving/computer work.      Home Exercise Program:    [x] (05664) Reviewed/Progressed HEP activities related to strengthening, flexibility, endurance, ROM of scapular, scapulothoracic and UE control with self care, reaching, carrying, lifting, house/yardwork, driving/computer work  [] (03989) Reviewed/Progressed HEP activities related to improving balance, coordination, kinesthetic sense, posture, motor skill, proprioception of scapular, scapulothoracic and UE control with self care, reaching, carrying, lifting, house/yardwork, driving/computer work      Manual Treatments:  PROM / STM / Oscillations-Mobs:  G-I, II, III, IV (PA's, Inf., Post.)  [] (33771) Provided manual therapy to mobilize soft tissue/joints of cervical/CT, scapular GHJ and UE for the purpose of modulating pain, promoting relaxation,  increasing ROM, reducing/eliminating soft tissue swelling/inflammation/restriction, improving soft tissue extensibility and allowing for proper ROM for normal function with self care, reaching, carrying, lifting, house/yardwork, driving/computer work    Modalities:  Ice x 15 mins to R shoulder    Charges:  Timed Code Treatment Minutes: 28   Total Treatment Minutes: 55     [] EVAL (LOW) 58720   [] EVAL (MOD) 84914   [] EVAL (HIGH) 26125   [] RE-EVAL   [x] SI(65408) x1     [] IONTO  [x] NMR (20347) x1     [] VASO  [] Manual (50408) x      [] Other:  [] TA x      [] Mech Traction (84013)  [] ES(attended) (97231)      [] ES (un) (38763): signed by: Valeria Rajput PT, DPT

## 2019-06-18 ENCOUNTER — HOSPITAL ENCOUNTER (OUTPATIENT)
Dept: PHYSICAL THERAPY | Age: 74
Setting detail: THERAPIES SERIES
Discharge: HOME | End: 2019-06-18
Payer: MEDICARE

## 2019-06-18 PROCEDURE — 97110 THERAPEUTIC EXERCISES: CPT | Performed by: PHYSICAL THERAPIST

## 2019-06-18 PROCEDURE — 97112 NEUROMUSCULAR REEDUCATION: CPT | Performed by: PHYSICAL THERAPIST

## 2019-06-18 NOTE — FLOWSHEET NOTE
[facility], [place]                                                         Physical Therapy Daily Treatment Note  Date:  2019    Patient Name:  Leeroy Dawson    :  1945  MRN: [id number]    Medical/Treatment Diagnosis Information:  · Diagnosis: M25.511 (ICD-10-CM) - Acute pain of right shoulder; M75.41 (ICD-10-CM) - Impingement syndrome of right shoulder; M75.81 (ICD-10-CM) - Tendinitis of right rotator cuff  · Treatment Diagnosis: M25.511 (ICD-10-CM) - Acute pain of right shoulder; M62.81 muscle weakness  Insurance/Certification information:  PT Insurance Information: Medicare  Physician Information:  Referring Practitioner: Carlos Knapp MD  Plan of care signed (Y/N): Y    Date of Patient follow up with Physician:       Progress Note: [x]  Yes  []  No  Next due by: Visit #10      Latex Allergy:  [x]NO      []YES  Preferred Language for Healthcare:   [x]English       []other:    Visit # Insurance Allowable   6 Medicare     Pain level:  0/10 On 2019    SUBJECTIVE: Pt reports that his shoulder felt pretty good. He did get outside to do yard work one day this weekend. He does notice it reaching overhead but not as bad as it used to be.  He is being cautious and not lifting a lot with R UE.      OBJECTIVE: See eval    Observation:    Test measurements:       CERV ROM       Cervical Flexion Tight but no pain     Cervical Extension \"     Cervical SB \"     Cervical rotation \"                   ROM PROM AROM  Comment     L R L R- 19     Flexion     155 143     Abduction     180 165     ER     81 at side; 70 at 90 abd 65 at side; 66 at 90 abd     IR     BB to T8 BB to L2     Other             Other                    Strength L R Comment   Flexion 5 4+ pain     Abduction 5 4 pain     ER 4+ 4- pain     IR 5 5     Supraspinatus         Upper Trap         Lower Trap         Mid Trap         Rhomboids     Traction (40722)  [] ES(attended) (53278)      [] ES (un) (28364):       GOALS:Short Term Goals: To be achieved in: 2 weeks  1. Independent in HEP and progression per patient tolerance, in order to prevent re-injury. 2. Patient will have a decrease in pain to 0/10 to facilitate improvement in movement, function, and ADLs as indicated by Functional Deficits.     Long Term Goals: To be achieved in: 6 weeks  1. Disability index score of 78% or more for the UEFI to assist with reaching prior level of function. 2. Patient will demonstrate increased R shoulder AROM to BB to L1 and flex to 150  to allow for proper joint functioning as indicated by patients Functional Deficits. 3. Patient will demonstrate an increase in R shoulder strength to 4+/5 flex, abd, and ER to allow for proper functional mobility as indicated by patients Functional Deficits. 4. Patient will return to yard work without increased symptoms or restriction. 5. Pt will be Indep with ADLs above shoulder height without pain in R shoulder. Progression Towards Functional goals:  [] Patient is progressing as expected towards functional goals listed. [] Progression is slowed due to complexities listed. [] Progression has been slowed due to co-morbidities. [x] Plan just implemented, too soon to assess goals progression  [] Other:     ASSESSMENT:  See eval    Treatment/Activity Tolerance:  [x] Patient tolerated treatment well [] Patient limited by fatique  [] Patient limited by pain  [] Patient limited by other medical complications  [x] Other: Pt was fatigued by both scaption and ball on wall exercise today. He was able to add more weight to [place] flex and serratus today without pain. He has been doing well with his stretches and did improve in BB motion but limited progress with overhead motions. He will continue to benefit from skilled PT to progress strength.      Prognosis: [x] Good [] Fair  [] Poor    Patient Requires Follow-up: [x] Yes  [] No    PLAN: See eval;  add scap to HEP; consider d/c vs follow up in 1-2 weeks; check strength  [x] Continue per plan of care [] Alter current plan (see comments)  [] Plan of care initiated [] Hold pending MD visit [] Discharge    Electronically signed by: Alvarez Holamn PT, DPT

## 2019-06-21 ENCOUNTER — HOSPITAL ENCOUNTER (OUTPATIENT)
Dept: PHYSICAL THERAPY | Age: 74
Setting detail: THERAPIES SERIES
Discharge: HOME | End: 2019-06-21
Payer: MEDICARE

## 2019-06-21 PROCEDURE — 97112 NEUROMUSCULAR REEDUCATION: CPT | Performed by: PHYSICAL THERAPIST

## 2019-06-21 PROCEDURE — 97110 THERAPEUTIC EXERCISES: CPT | Performed by: PHYSICAL THERAPIST

## 2019-06-21 PROCEDURE — 97530 THERAPEUTIC ACTIVITIES: CPT | Performed by: PHYSICAL THERAPIST

## 2019-06-21 NOTE — PLAN OF CARE
[address]                                                     Physical Therapy Discharge Plan of Qi Andujar      Dear  Dr. Emilia Abdi,    We had the pleasure of treating the following patient for physical therapy services at [address] [place] [address]. A summary of our findings can be found in the updated assessment below. This includes our plan of care. If you have any questions or concerns regarding these findings, please do not hesitate to contact me at the office phone number checked above. Thank you for the referral.     Physician Signature:________________________________Date:__________________  By signing above (or electronic signature), therapists plan is approved by physician    Date Range Of Visits: 19- 2019    Total Visits to Date: 7  Overall Response to Treatment:   [x]Patient is responding well to treatment and improvement is noted with regards  to goals   []Patient should continue to improve in reasonable time if they continue HEP   []Patient has plateaued and is no longer responding to skilled PT intervention    []Patient is getting worse and would benefit from return to referring MD   []Patient unable to adhere to initial POC   [x]Other: Pt has progressed well in therapy. He shows improved strength throughout his R shoulder and reports decreased pain and improved function. Pt wants to keep going on PT on his own and pt was educated on importance of doing these at home. Pt is now d/c but to call with any questions or concerns or if needs to follow up in next 30 days.         Physical Therapy Daily Treatment Note  Date:  2019    Patient Name:  Mihai Talley    :  1945  MRN: [id number]    Medical/Treatment Diagnosis Information:  · Diagnosis: M25.511 (ICD-10-CM) - Acute pain of right shoulder; M75.41 (ICD-10-CM) - Impingement syndrome of right shoulder; M75.81 (ICD-10-CM) - Tendinitis of right rotator cuff  · Treatment Diagnosis: M25.511 (ICD-10-CM) - Acute pain of right shoulder; M62.81 muscle weakness  Insurance/Certification information:  PT Insurance Information: Medicare  Physician Information:  Referring Practitioner: Anupam Redd MD  Plan of care signed (Y/N): Y    Date of Patient follow up with Physician:       Progress Note: [x]  Yes  []  No  Next due by: Visit #10      Latex Allergy:  [x]NO      []YES  Preferred Language for Healthcare:   [x]English       []other:    Visit # Insurance Allowable   7 Medicare     Pain level:  0/10 On 6/21/2019    SUBJECTIVE:  Pt reports his shoulder is feeling good. It still crunches and still has pain at extremes. He is feeling better overall.      OBJECTIVE:    Observation:    Test measurements:       CERV ROM       Cervical Flexion Tight but no pain     Cervical Extension \"     Cervical SB \"     Cervical rotation \"                   ROM PROM AROM  Comment     L R L R- 6/18/19     Flexion     155 143     Abduction     180 165     ER     81 at side; 70 at 90 abd 65 at side; 66 at 90 abd     IR     BB to T8 BB to L2     Other             Other                    Strength L R- 6/21/19 Comment   Flexion 5 5      Abduction 5 4 +     ER 4+ 4     IR 5 5     Supraspinatus         Upper Trap         Lower Trap         Mid Trap         Rhomboids         Biceps 5 5     Triceps 5 5      Horizontal Abduction         Horizontal Adduction         Lats               Functional assessment on 6/21/2019   UEFI- 93.75%          RESTRICTIONS/PRECAUTIONS: hard of hearing and 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        Exercises/Interventions:   Exercise/Equipment Resistance/Repetitions Other comments   Aerobic Conditioning     Aerodyne          Stretching/PROM     Wand 10 x 10 secs at side and 90 abd    Table Slides     Wall slides  10 x 10 secs    UE [place]     Pulleys     Pendulum     BB IR 10 x 10 secs    SL Program:    [x] (12210) Reviewed/Progressed HEP activities related to strengthening, flexibility, endurance, ROM of scapular, scapulothoracic and UE control with self care, reaching, carrying, lifting, house/yardwork, driving/computer work  [] (80133) Reviewed/Progressed HEP activities related to improving balance, coordination, kinesthetic sense, posture, motor skill, proprioception of scapular, scapulothoracic and UE control with self care, reaching, carrying, lifting, house/yardwork, driving/computer work      Manual Treatments:  PROM / STM / Oscillations-Mobs:  G-I, II, III, IV (PA's, Inf., Post.)  [] (79937) Provided manual therapy to mobilize soft tissue/joints of cervical/CT, scapular GHJ and UE for the purpose of modulating pain, promoting relaxation,  increasing ROM, reducing/eliminating soft tissue swelling/inflammation/restriction, improving soft tissue extensibility and allowing for proper ROM for normal function with self care, reaching, carrying, lifting, house/yardwork, driving/computer work    Modalities:  Ice x 15 mins to R shoulder    Charges:  Timed Code Treatment Minutes: 41   Total Treatment Minutes: 72     [] EVAL (LOW) 97516   [] EVAL (MOD) 09246   [] EVAL (HIGH) 73496   [] RE-EVAL   [x] IE(57091) x1     [] IONTO  [x] NMR (33307) x1     [] VASO  [] Manual (91813) x      [] Other:  [x] TA x 1     [] Mech Traction (89044)  [] ES(attended) (67461)      [] ES (un) (07112):       GOALS:Short Term Goals: To be achieved in: 2 weeks  1. Independent in HEP and progression per patient tolerance, in order to prevent re-injury. - MET  2. Patient will have a decrease in pain to 0/10 to facilitate improvement in movement, function, and ADLs as indicated by Functional Deficits. - MET     Long Term Goals: To be achieved in: 6 weeks  1. Disability index score of 78% or more for the UEFI to assist with reaching prior level of function.- MET   2.  Patient will demonstrate increased R shoulder AROM to BB to L1 and flex to 150  to allow for proper joint functioning as indicated by patients Functional Deficits. - NOT MET  3. Patient will demonstrate an increase in R shoulder strength to 4+/5 flex, abd, and ER to allow for proper functional mobility as indicated by patients Functional Deficits. - progressing, 4/5 ER  4. Patient will return to yard work without increased symptoms or restriction. - Has not attempted all of it yet but has done some without pain  5. Pt will be Indep with ADLs above shoulder height without pain in R shoulder. - Progressing, mild pain        Progression Towards Functional goals:  [x] Patient is progressing as expected towards functional goals listed. [] Progression is slowed due to complexities listed. [] Progression has been slowed due to co-morbidities. [] Plan just implemented, too soon to assess goals progression  [] Other:     ASSESSMENT:  See eval    Treatment/Activity Tolerance:  [x] Patient tolerated treatment well [] Patient limited by fatique  [] Patient limited by pain  [] Patient limited by other medical complications  [x] Other: Pt has progressed well in therapy. He shows improved strength throughout his R shoulder and reports decreased pain and improved function. Pt wants to keep going on PT on his own and pt was educated on importance of doing these at home. Pt is now d/c but to call with any questions or concerns or if needs to follow up in next 30 days.     Prognosis: [x] Good [] Fair  [] Poor    Patient Requires Follow-up: [] Yes  [x] No    PLAN:  [] Continue per plan of care [] Alter current plan (see comments)  [] Plan of care initiated [] Hold pending MD visit [x] Discharge    Electronically signed by: Nadia Resendiz PT, DPT

## 2019-06-26 ENCOUNTER — HOSPITAL ENCOUNTER (OUTPATIENT)
Dept: GENERAL RADIOLOGY | Age: 74
Discharge: HOME | End: 2019-06-26
Payer: MEDICARE

## 2019-06-26 ENCOUNTER — HOSPITAL ENCOUNTER (OUTPATIENT)
Age: 74
Discharge: HOME | End: 2019-06-26
Payer: MEDICARE

## 2019-06-26 ENCOUNTER — OFFICE VISIT (OUTPATIENT)
Dept: PULMONOLOGY | Age: 74
End: 2019-06-26
Payer: MEDICARE

## 2019-06-26 VITALS
DIASTOLIC BLOOD PRESSURE: 84 MMHG | BODY MASS INDEX: 26.28 KG/M2 | SYSTOLIC BLOOD PRESSURE: 130 MMHG | HEART RATE: 73 BPM | OXYGEN SATURATION: 94 % | WEIGHT: 194 LBS | HEIGHT: 72 IN | TEMPERATURE: 97.6 F | RESPIRATION RATE: 24 BRPM

## 2019-06-26 DIAGNOSIS — J96.11 CHRONIC RESPIRATORY FAILURE WITH HYPOXIA (HCC): Primary | ICD-10-CM

## 2019-06-26 DIAGNOSIS — R06.02 SHORTNESS OF BREATH: ICD-10-CM

## 2019-06-26 DIAGNOSIS — C81.90 HODGKIN LYMPHOMA, UNSPECIFIED HODGKIN LYMPHOMA TYPE, UNSPECIFIED BODY REGION (HCC): ICD-10-CM

## 2019-06-26 DIAGNOSIS — J96.11 CHRONIC HYPOXEMIC RESPIRATORY FAILURE (HCC): ICD-10-CM

## 2019-06-26 PROCEDURE — 4040F PNEUMOC VAC/ADMIN/RCVD: CPT | Performed by: INTERNAL MEDICINE

## 2019-06-26 PROCEDURE — G8417 CALC BMI ABV UP PARAM F/U: HCPCS | Performed by: INTERNAL MEDICINE

## 2019-06-26 PROCEDURE — G8427 DOCREV CUR MEDS BY ELIG CLIN: HCPCS | Performed by: INTERNAL MEDICINE

## 2019-06-26 PROCEDURE — 99212 OFFICE O/P EST SF 10 MIN: CPT | Performed by: INTERNAL MEDICINE

## 2019-06-26 PROCEDURE — 3017F COLORECTAL CA SCREEN DOC REV: CPT | Performed by: INTERNAL MEDICINE

## 2019-06-26 PROCEDURE — 1036F TOBACCO NON-USER: CPT | Performed by: INTERNAL MEDICINE

## 2019-06-26 PROCEDURE — 71046 X-RAY EXAM CHEST 2 VIEWS: CPT

## 2019-06-26 PROCEDURE — 1123F ACP DISCUSS/DSCN MKR DOCD: CPT | Performed by: INTERNAL MEDICINE

## 2019-06-26 NOTE — PROGRESS NOTES
REASON FOR CONSULTATION:  Chief Complaint   Patient presents with    Follow-up     hypoxia         Consult at request of Mauricio Higginbotham MD     PCP: Mauricio Higginbotham MD    HISTORY OF PRESENT ILLNESS: Lorena Merino is a 74y.o. year old male with a history of Hodgkin's lymphoma status post bleomycin therapy, chronic respiratory failure secondary to bleomycin pulmonary toxicity, recently finished steroid therapy for acute lung injury on top of chronic fibrosis. Unclear etiology for worsening of fibrosis at time I first saw him. Since that time his chest imaging has remained stable, his pulmonary function tests are stable. He is previously underwent a 6-minute walk test that documented a 8 L/min O2 requirement with exertion. He has been reluctant to wear his oxygen as he feels it limits his mobility and functional capacity. However, he has noticed that when he does exert himself he gets very winded, lightheaded, and also often has to sit down. Was evaluated in the oncology clinic today to go over lab results at which time he was found to be very winded and hypoxic on room air. Patient was not wearing his oxygen as prescribed. He was referred to us for an acute care visit. When I review patient's symptoms he reports there are no fevers, no cough, just persistent shortness of breath with exertion that has been ongoing for the past year.  sPO2 94% at rest.    Past Medical History:   Diagnosis Date    Bleomycin lung toxicity     Lymphoma ([facility] [address]     Hodgkins stage 3       Past Surgical History:   Procedure Laterality Date    BONE MARROW BIOPSY  8/8 and 8/18/2014    Positive for lymphoma with subsequent negative    CATARACT REMOVAL Bilateral 7/23/2015    COLONOSCOPY  3/15    Dr. Feliz Cowart  8/6/14    Dr. Ronnie Ashley Left 06/16/2016    CERVICAL LYMPH NODE BIOPSY        TUNNELED VENOUS PORT PLACEMENT  08/14/2014    Dr. Prashanth Barker, power port    UPPER GASTROINTESTINAL ENDOSCOPY  3/15    Dr. Jory Smith       family history includes No Known Problems in his brother, father, maternal aunt, maternal grandfather, maternal grandmother, maternal uncle, mother, paternal aunt, paternal grandfather, paternal grandmother, paternal uncle, sister, and another family member. SOCIAL HISTORY:   reports that he has never smoked. He has never used smokeless tobacco.      ALLERGIES:  Patient is allergic to [employer] tartrate]; bleomycin; and xarelto [rivaroxaban]. REVIEW OF SYSTEMS:  Constitutional: Negative for fever   HENT: Negative for sore throat  Eyes: Negative for redness   Respiratory: dyspnea, cough  Cardiovascular: Negative for chest pain  Gastrointestinal: Negative for vomiting, diarrhea   Genitourinary: Negative for hematuria   Musculoskeletal: Negative for arthralgias   Skin: Negative for rash  Neurological: Negative for syncope  Hematological: Negative for adenopathy  Psychiatric/Behavorial: Negative for anxiety    Objective:   PHYSICAL EXAM:  Blood pressure 130/84, pulse 73, temperature 97.6 °F (36.4 °C), temperature source Oral, resp. rate 24, height 6' (1.829 m), weight 194 lb (88 kg), SpO2 94 %.'  Gen: No distress. Eyes: PERRL. No sclera icterus. No conjunctival injection. ENT: No discharge. Pharynx clear. External appearance of ears and nose normal.  Neck: Trachea midline. No obvious mass. Resp:  No wheezing, no crackles, no accessory muscle use, no dullness to percussion. CV: Regular rate. Regular rhythm. No murmur or rub. No edema. GI: Non-tender. Non-distended. No hernia. Skin: Warm, dry, normal texture and turgor. No nodule on exposed extremities. Lymph: No cervical LAD. No supraclavicular LAD. M/S: No cyanosis. No clubbing. No joint deformity. Neuro: Moves all four extremities. Psych: Oriented x 3. No anxiety. Awake. Alert. Intact judgement and insight.     Current Outpatient Medications   Medication Sig Dispense Refill    levothyroxine (LEVOTHROID) 88 MCG tablet Take 1 tablet by mouth daily 90 tablet 3    aspirin 81 MG chewable tablet Take 81 mg by mouth daily      Multiple Vitamins-Minerals (MULTIVITAMIN PO) Take  by mouth.  predniSONE (DELTASONE) 10 MG tablet Take 10 mg by mouth every other day        No current facility-administered medications for this visit. Data Reviewed:   CBC and Renal reviewed  Last CBC  Lab Results   Component Value Date    WBC 10.1 02/05/2019    RBC 5.18 02/05/2019    RBC 5.00 08/22/2017    HGB 15.8 02/05/2019    MCV 89.6 02/05/2019     02/05/2019     Last Renal  Lab Results   Component Value Date     08/22/2017    K 3.9 08/22/2017     08/22/2017    CO2 27 08/22/2017    CO2 28 08/08/2017    CO2 28 07/25/2017    BUN 15 08/22/2017    CREATININE 0.8 05/21/2019    CREATININE 1.3 08/22/2017    GLUCOSE 129 08/22/2017    CALCIUM 9.0 08/22/2017       Last ABG  POC Blood Gas: No results found for: POCPH, POCPCO2, POCPO2, POCHCO3, NBEA, IUDF0KYM  No results for input(s): PH, PCO2, PO2, HCO3, BE, O2SAT in the last 72 hours. Radiology Review:  Pertinent images / reports were reviewed as a part of this visit. CT Chest w/ contrast:   Results for orders placed during the hospital encounter of 08/17/18   CT CHEST W CONTRAST    Narrative EXAMINATION:  CT OF THE CHEST WITH CONTRAST; CT OF THE ABDOMEN AND PELVIS WITH CONTRAST  8/17/2018 9:37 am    TECHNIQUE:  CT of the chest was performed with the administration of intravenous  contrast. Multiplanar reformatted images are provided for review. Dose  modulation, iterative reconstruction, and/or weight based adjustment of the  mA/kV was utilized to reduce the radiation dose to as low as reasonably  achievable.; CT of the abdomen and pelvis was performed with the  administration of intravenous contrast. Multiplanar reformatted images are  provided for review.  Dose modulation, iterative reconstruction, and/or weight  based adjustment of unremarkable. Peritoneum/Retroperitoneum: There is no free air, free fluid or  intraperitoneal inflammatory change. Enlarged lymph nodes within the upper  retroperitoneum are unchanged in size, number and appearance. Bones/Soft Tissues: There is no fracture or osseous destruction. Impression Stable CT of the chest, abdomen and pelvis. Negative for progression of  disease. CT Chest w/o contrast:   Results for orders placed during the hospital encounter of 06/14/16   CT Chest WO Contrast    Narrative EXAMINATION:  CT OF THE CHEST WITHOUT CONTRAST 6/14/2016 10:18 am    TECHNIQUE:  CT of the chest was performed without the administration of intravenous  contrast. Multiplanar reformatted images are provided for review. Dose  modulation, iterative reconstruction, and/or weight based adjustment of the  mA/kV was utilized to reduce the radiation dose to as low as reasonably  achievable. 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)  TECHNOLOGIST PROVIDED HISTORY:  Ordering Physician Provided Reason for Exam: SOB  Acuity: Chronic  Type of Exam: Subsequent/Follow-up  Relevant Medical/Surgical History: Lymphoma, chemo,Nodular sclerosis  classical Hodgkin lymphoma of lymph nodes of multiple sites    FINDINGS:  There is increased axillary adenopathy bilaterally when compared to prior. Mediastinal adenopathy is seen and unchanged. There is involvement of the AP  window, pretracheal, subcarinal, and hilar regions. Multi focal parenchymal nodular opacities are seen throughout the lungs  bilaterally. Findings are increased. Scattered air bronchograms are seen    There is increased left-sided pleural effusion. Liver appears cirrhotic on limited images of the upper abdomen. Small stone  seen in left kidney. Adenopathy is seen in the [place].       Impression IMPRESSION:  Increased patchy and confluent nodular opacities throughout the lungs since  05/13/2016, suspicious for pneumonia in the appropriate clinical scenario. There is increased left-sided pleural effusion. Involvement of the lungs  with lymphoma is considered less likely. Increased axillary adenopathy bilaterally with persistent mediastinal  adenopathy. By report there is a history of lymphoma. Cirrhosis         CTPA: No results found for this or any previous visit. CXR PA/LAT: No results found for this or any previous visit. CXR portable:   Results for orders placed during the hospital encounter of 06/15/16   XR Chest Portable    Narrative PORTABLE CHEST ON 6/20/2016 AT 2140 HRS. HISTORY: Reaction. A single portable view of the chest was performed. Comparison is  made to prior exam from 4 hours earlier. Left-sided Port-A-Cath is unchanged. The heart is normal in size. The trachea is midline. Patchy nodular opacities are seen in both  lungs unchanged. The costophrenic angles are clear. Impression Stable bilateral airspace opacities        PULMONARY FUNCTION TESTING RESULTS:  Date FVC (pred) L-  % FEV1(pred) L-  % FEV1/FVC  (pred) % TLC  % [id number] (pred) L- % DLCO (pred)%   4/9/19 2.51 - 62 2.19 - 72   87 56  53   1/23/19 2.58 - 63 2.21 - 71 85 68  56       Assessment/Plan:       Diagnosis Orders   1. Chronic respiratory failure with hypoxia (HCC)  6 Minute Walk Test         Problem List Items Addressed This Visit     Chronic respiratory failure with hypoxia ([address] - Primary     -Patient's current symptomatology during his oncology visit is directly related to his chronic hypoxemic respiratory failure  -He is somewhat interested and I will see his trying to self wean himself off of oxygen. This is due to the fact that he feels it is limiting his functional capacity which I completely understand.   However, later discussion regarding the fact that he simply needs the oxygen and if he does not wear it he will continue to have episodes of symptomatic hypoxia including shortness of breath, lightheadedness and even possibly syncope. We also discussed the risk of having episodes of hypoxemia in regards to stress on end organ function.  -While I am not sure what caused the advancement in his fibrosis over the past year, it does appear stable at this time. I had a andry discussion with him regarding my expectation that he will never completely come off of oxygen.  -Follow-up with me as scheduled with pulmonary function testing and 6-minute walk test.           Relevant Orders    6 Minute Walk Test           This note was transcribed using [address] [employer]. Please disregard any translational errors.     Sukumar Ricketts [address] [place]

## 2019-07-01 ENCOUNTER — HOSPITAL ENCOUNTER (OUTPATIENT)
Dept: CT IMAGING | Age: 74
Discharge: HOME | End: 2019-07-01
Payer: MEDICARE

## 2019-07-01 DIAGNOSIS — J96.11 CHRONIC HYPOXEMIC RESPIRATORY FAILURE (HCC): ICD-10-CM

## 2019-07-01 DIAGNOSIS — C81.00 NODULAR LYMPHOCYTE PREDOMINANT HODGKIN LYMPHOMA, UNSPECIFIED BODY REGION (HCC): ICD-10-CM

## 2019-07-01 DIAGNOSIS — R09.02 HYPOXEMIA: ICD-10-CM

## 2019-07-01 PROCEDURE — 82565 ASSAY OF CREATININE: CPT

## 2019-07-01 PROCEDURE — 71260 CT THORAX DX C+: CPT

## 2019-07-01 PROCEDURE — 6360000004 HC RX CONTRAST MEDICATION: Performed by: NURSE PRACTITIONER

## 2019-07-01 RX ADMIN — IOPAMIDOL 75 ML: 755 INJECTION, SOLUTION INTRAVENOUS at 17:03

## 2019-07-05 ENCOUNTER — TELEPHONE (OUTPATIENT)
Dept: PULMONOLOGY | Age: 74
End: 2019-07-05

## 2019-07-12 ENCOUNTER — TELEPHONE (OUTPATIENT)
Dept: PULMONOLOGY | Age: 74
End: 2019-07-12

## 2019-07-16 ENCOUNTER — TELEPHONE (OUTPATIENT)
Dept: PULMONOLOGY | Age: 74
End: 2019-07-16

## 2019-08-30 ENCOUNTER — CARE COORDINATION (OUTPATIENT)
Dept: CASE MANAGEMENT | Age: 74
End: 2019-08-30

## 2019-09-16 ENCOUNTER — TELEPHONE (OUTPATIENT)
Dept: ENDOCRINOLOGY | Age: 74
End: 2019-09-16

## 2019-09-16 RX ORDER — LEVOTHYROXINE SODIUM 88 UG/1
88 TABLET ORAL DAILY
Qty: 90 TABLET | Refills: 0 | Status: SHIPPED | OUTPATIENT
Start: 2019-09-16

## 2019-10-22 ENCOUNTER — CARE COORDINATION (OUTPATIENT)
Dept: CASE MANAGEMENT | Age: 74
End: 2019-10-22